# Patient Record
Sex: FEMALE | Race: OTHER | Employment: FULL TIME | URBAN - METROPOLITAN AREA
[De-identification: names, ages, dates, MRNs, and addresses within clinical notes are randomized per-mention and may not be internally consistent; named-entity substitution may affect disease eponyms.]

---

## 2019-02-04 ENCOUNTER — HOSPITAL ENCOUNTER (OUTPATIENT)
Age: 29
Discharge: HOME | End: 2019-02-04
Payer: COMMERCIAL

## 2019-02-04 ENCOUNTER — HOSPITAL ENCOUNTER (OUTPATIENT)
Dept: GENERAL RADIOLOGY | Age: 29
Discharge: HOME | End: 2019-02-04
Payer: COMMERCIAL

## 2019-02-04 DIAGNOSIS — Z11.1 SCREENING-PULMONARY TB: ICD-10-CM

## 2019-02-04 PROCEDURE — 71046 X-RAY EXAM CHEST 2 VIEWS: CPT

## 2019-10-31 ENCOUNTER — HOSPITAL ENCOUNTER (EMERGENCY)
Age: 29
Discharge: HOME | End: 2019-10-31
Attending: EMERGENCY MEDICINE
Payer: COMMERCIAL

## 2019-10-31 VITALS
DIASTOLIC BLOOD PRESSURE: 80 MMHG | WEIGHT: 212.52 LBS | RESPIRATION RATE: 18 BRPM | OXYGEN SATURATION: 100 % | TEMPERATURE: 99.3 F | HEART RATE: 100 BPM | SYSTOLIC BLOOD PRESSURE: 156 MMHG | BODY MASS INDEX: 34.16 KG/M2 | HEIGHT: 66 IN

## 2019-10-31 DIAGNOSIS — M79.605 LEFT LEG PAIN: Primary | ICD-10-CM

## 2019-10-31 PROCEDURE — 96372 THER/PROPH/DIAG INJ SC/IM: CPT

## 2019-10-31 PROCEDURE — 99283 EMERGENCY DEPT VISIT LOW MDM: CPT

## 2019-10-31 PROCEDURE — 6360000002 HC RX W HCPCS: Performed by: EMERGENCY MEDICINE

## 2019-10-31 RX ADMIN — ENOXAPARIN SODIUM 100 MG: 100 INJECTION SUBCUTANEOUS at 01:34

## 2019-10-31 SDOH — HEALTH STABILITY: MENTAL HEALTH: HOW OFTEN DO YOU HAVE A DRINK CONTAINING ALCOHOL?: NEVER

## 2019-10-31 ASSESSMENT — PAIN DESCRIPTION - DESCRIPTORS: DESCRIPTORS: ACHING

## 2019-10-31 ASSESSMENT — PAIN DESCRIPTION - PAIN TYPE: TYPE: ACUTE PAIN

## 2019-10-31 ASSESSMENT — PAIN SCALES - WONG BAKER: WONGBAKER_NUMERICALRESPONSE: 4

## 2019-10-31 ASSESSMENT — PAIN DESCRIPTION - LOCATION: LOCATION: LEG

## 2019-10-31 ASSESSMENT — PAIN SCALES - GENERAL
PAINLEVEL_OUTOF10: 6
PAINLEVEL_OUTOF10: 6

## 2021-01-22 ENCOUNTER — OFFICE VISIT (OUTPATIENT)
Dept: ORTHOPEDIC SURGERY | Age: 31
End: 2021-01-22

## 2021-01-22 VITALS — TEMPERATURE: 97.4 F | BODY MASS INDEX: 34.07 KG/M2 | HEIGHT: 66 IN | WEIGHT: 212 LBS

## 2021-01-22 DIAGNOSIS — M72.2 PLANTAR FASCIITIS OF LEFT FOOT: Primary | ICD-10-CM

## 2021-01-22 PROCEDURE — 99203 OFFICE O/P NEW LOW 30 MIN: CPT | Performed by: ORTHOPAEDIC SURGERY

## 2021-01-22 PROCEDURE — L3170 FOOT PLAS HEEL STABI PRE OTS: HCPCS | Performed by: ORTHOPAEDIC SURGERY

## 2021-01-22 RX ORDER — NAPROXEN 500 MG/1
500 TABLET ORAL 2 TIMES DAILY WITH MEALS
Qty: 60 TABLET | Refills: 0 | Status: SHIPPED | OUTPATIENT
Start: 2021-01-22 | End: 2022-09-16 | Stop reason: ALTCHOICE

## 2021-01-24 NOTE — PROGRESS NOTES
CHIEF COMPLAINT: Left heel pain/plantar fasciitis. HISTORY:  Ms. Amelia Zamorano 31 y.o.  female presents today for the first visit for evaluation of left heel pain which started 2020.  She is complaining of sharp  pain. Pain is increase with standing and wallking. Pain is sharp early in the morning with first few steps, dull achy pain by the end of the day. No radiation and no numbness and tingling sensation. No other complaint. 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     Past Medical History:   Diagnosis Date    [facility] ([facility])     Pulmonary embolism (HCC)        Past Surgical History:   Procedure Laterality Date     SECTION      LIPOSUCTION         Social History     Socioeconomic History    Marital status: Single     Spouse name: Not on file    Number of children: Not on file    Years of education: Not on file    Highest education level: Not on file   Occupational History    Not on file   Social Needs    Financial resource strain: Not on file    Food insecurity     Worry: Not on file     Inability: Not on file    Transportation needs     Medical: Not on file     Non-medical: Not on file   Tobacco Use    Smoking status: Never Smoker    Smokeless tobacco: Never Used   Substance and Sexual Activity    Alcohol use: Never     Frequency: Never    Drug use: Never    Sexual activity: Not on file   Lifestyle    Physical activity     Days per week: Not on file     Minutes per session: Not on file    Stress: Not on file   Relationships    Social connections     Talks on phone: Not on file     Gets together: Not on file     Attends [employer] service: Not on file     Active member of club or organization: Not on file     Attends meetings of clubs or organizations: Not on file     Relationship status: Not on file    Intimate partner violence     Fear of current or ex partner: Not on file     Emotionally abused: Not on file     Physically abused: Not on file     Forced sexual activity: Not on file   Other Topics Concern    Not on file   Social History Narrative    Not on file       History reviewed. No pertinent family history. No current outpatient medications on file prior to visit. No current facility-administered medications on file prior to visit. Pertinent items are noted in HPI  Review of systems reviewed from Patient History Form dated on 1/22/20/201 and available in the patient's chart under the Media tab. No change. PHYSICAL EXAMINATION:  Ms. Melly Tejada is a very pleasant 31 y.o.  female who presents today in no acute distress, awake, alert, and oriented. She is well dressed, nourished and  groomed. Patient with normal affect. Height is  5' 6\" (1.676 m), weight is 212 lb (96.2 kg), Body mass index is 34.22 kg/m². Resting respiratory rate is 16. Examination of the gait, showed that the patient walks heel-toe with a non-antalgic gait and no limp.  Examination of both ankles showing a good range of motion.  She has dorsiflexion to about 10 degrees bilaterally, which increased with knee flexion. She has intact sensation and good pedal pulses.  She has good strength in all four planes, including eversion, and has tenderness on deep palpation over the medial calcaneal tubercle, compared to the other side.  The ankles are stable to drawer test bilaterally, equally.      IMAGING:Xray's were reviewed.  3 views of the left foot taken in office today, and showed no acute fracture. No other abnormality. IMPRESSION: Left plantar fasciitis. PLAN: I discussed with the patient the treatment options. We recommended stretching exercises of the calf as well as stretching of the plantar fascia which was taught to the patient today. She will take NSAIDS Naprosyn. Use silicone heel pad. F/u in 6 weeks, PT if needed. She understands that this may take up to 6 months for the pain to resolve.           Procedures    Visco N Heel Shoe Inserts Patient was prescribed a Visco N Heel Shoe Insert. The left foot  will require protection / support from this orthosis to improve their function. The orthosis will assist in protecting the affected area, provide functional support and facilitate healing. The patient was educated and fit by a healthcare professional with expert knowledge and specialization in brace application while under the direct supervision of the treating physician. Verbal and written instructions for the use of and application of this item were provided. They were instructed to contact the office immediately should the brace result in increased pain, decreased sensation, increased swelling or worsening of the condition.        Myrtle Centeno MD

## 2021-02-13 DIAGNOSIS — M72.2 PLANTAR FASCIITIS OF LEFT FOOT: ICD-10-CM

## 2021-02-16 RX ORDER — NAPROXEN 500 MG/1
TABLET ORAL
Qty: 60 TABLET | Refills: 0 | OUTPATIENT
Start: 2021-02-16

## 2022-09-16 ENCOUNTER — OFFICE VISIT (OUTPATIENT)
Dept: PRIMARY CARE CLINIC | Age: 32
End: 2022-09-16
Payer: COMMERCIAL

## 2022-09-16 ENCOUNTER — OFFICE VISIT (OUTPATIENT)
Dept: ENT CLINIC | Age: 32
End: 2022-09-16
Payer: COMMERCIAL

## 2022-09-16 VITALS
HEART RATE: 80 BPM | TEMPERATURE: 97.5 F | HEIGHT: 66 IN | DIASTOLIC BLOOD PRESSURE: 71 MMHG | SYSTOLIC BLOOD PRESSURE: 115 MMHG | WEIGHT: 211.4 LBS | BODY MASS INDEX: 33.97 KG/M2

## 2022-09-16 VITALS
BODY MASS INDEX: 34.1 KG/M2 | HEART RATE: 78 BPM | HEIGHT: 66 IN | DIASTOLIC BLOOD PRESSURE: 74 MMHG | SYSTOLIC BLOOD PRESSURE: 131 MMHG | WEIGHT: 212.2 LBS | TEMPERATURE: 97.5 F

## 2022-09-16 DIAGNOSIS — R59.0 POSTERIOR CERVICAL ADENOPATHY: Primary | ICD-10-CM

## 2022-09-16 DIAGNOSIS — Z11.4 SCREENING FOR HIV (HUMAN IMMUNODEFICIENCY VIRUS): ICD-10-CM

## 2022-09-16 DIAGNOSIS — R59.0 CERVICAL LYMPHADENOPATHY: ICD-10-CM

## 2022-09-16 DIAGNOSIS — Z11.59 ENCOUNTER FOR HEPATITIS C SCREENING TEST FOR LOW RISK PATIENT: ICD-10-CM

## 2022-09-16 DIAGNOSIS — Z23 NEED FOR IMMUNIZATION AGAINST INFLUENZA: ICD-10-CM

## 2022-09-16 DIAGNOSIS — Z12.4 SCREENING FOR CERVICAL CANCER: ICD-10-CM

## 2022-09-16 DIAGNOSIS — Z00.00 ROUTINE GENERAL MEDICAL EXAMINATION AT A HEALTH CARE FACILITY: Primary | ICD-10-CM

## 2022-09-16 PROCEDURE — 99385 PREV VISIT NEW AGE 18-39: CPT | Performed by: STUDENT IN AN ORGANIZED HEALTH CARE EDUCATION/TRAINING PROGRAM

## 2022-09-16 PROCEDURE — 99203 OFFICE O/P NEW LOW 30 MIN: CPT | Performed by: OTOLARYNGOLOGY

## 2022-09-16 RX ORDER — ALBUTEROL SULFATE 0.63 MG/3ML
1 SOLUTION RESPIRATORY (INHALATION) EVERY 6 HOURS PRN
Status: ON HOLD | COMMUNITY
End: 2022-09-26

## 2022-09-16 SDOH — ECONOMIC STABILITY: FOOD INSECURITY: WITHIN THE PAST 12 MONTHS, THE FOOD YOU BOUGHT JUST DIDN'T LAST AND YOU DIDN'T HAVE MONEY TO GET MORE.: NEVER TRUE

## 2022-09-16 SDOH — ECONOMIC STABILITY: FOOD INSECURITY: WITHIN THE PAST 12 MONTHS, YOU WORRIED THAT YOUR FOOD WOULD RUN OUT BEFORE YOU GOT MONEY TO BUY MORE.: NEVER TRUE

## 2022-09-16 ASSESSMENT — PATIENT HEALTH QUESTIONNAIRE - PHQ9
9. THOUGHTS THAT YOU WOULD BE BETTER OFF DEAD, OR OF HURTING YOURSELF: 0
5. POOR APPETITE OR OVEREATING: 0
2. FEELING DOWN, DEPRESSED OR HOPELESS: 0
1. LITTLE INTEREST OR PLEASURE IN DOING THINGS: 0
SUM OF ALL RESPONSES TO PHQ QUESTIONS 1-9: 0
SUM OF ALL RESPONSES TO PHQ9 QUESTIONS 1 & 2: 0
10. IF YOU CHECKED OFF ANY PROBLEMS, HOW DIFFICULT HAVE THESE PROBLEMS MADE IT FOR YOU TO DO YOUR WORK, TAKE CARE OF THINGS AT HOME, OR GET ALONG WITH OTHER PEOPLE: 0
SUM OF ALL RESPONSES TO PHQ QUESTIONS 1-9: 0
6. FEELING BAD ABOUT YOURSELF - OR THAT YOU ARE A FAILURE OR HAVE LET YOURSELF OR YOUR FAMILY DOWN: 0
SUM OF ALL RESPONSES TO PHQ QUESTIONS 1-9: 0
4. FEELING TIRED OR HAVING LITTLE ENERGY: 0
SUM OF ALL RESPONSES TO PHQ QUESTIONS 1-9: 0
7. TROUBLE CONCENTRATING ON THINGS, SUCH AS READING THE NEWSPAPER OR WATCHING TELEVISION: 0
8. MOVING OR SPEAKING SO SLOWLY THAT OTHER PEOPLE COULD HAVE NOTICED. OR THE OPPOSITE, BEING SO FIGETY OR RESTLESS THAT YOU HAVE BEEN MOVING AROUND A LOT MORE THAN USUAL: 0
3. TROUBLE FALLING OR STAYING ASLEEP: 0

## 2022-09-16 ASSESSMENT — ENCOUNTER SYMPTOMS
STRIDOR: 0
ABDOMINAL PAIN: 0
SHORTNESS OF BREATH: 0
BLOOD IN STOOL: 0
CHEST TIGHTNESS: 0
BACK PAIN: 0
DIARRHEA: 0
CONSTIPATION: 0

## 2022-09-16 ASSESSMENT — SOCIAL DETERMINANTS OF HEALTH (SDOH): HOW HARD IS IT FOR YOU TO PAY FOR THE VERY BASICS LIKE FOOD, HOUSING, MEDICAL CARE, AND HEATING?: NOT HARD AT ALL

## 2022-09-16 NOTE — PROGRESS NOTES
2022    Lisbeth Parham (:  1990) is a 29 y.o. female, here for evaluation of the following medical concerns:    HPI    Well Adult Physical: Patient here for a comprehensive physical exam.The patient reports problems - enlarged lymph node  Do you take any herbs or supplements that were not prescribed by a doctor? no Are you taking calcium supplements? not applicable Are you taking aspirin daily? not applicable    Sexual activity: single partner, contraception - condoms   Diet: Unhealthy  Exercise: no regular exercise  Seatbelt use: yes    Review of Systems   Constitutional:  Positive for fatigue. Negative for activity change, appetite change and unexpected weight change. HENT:  Negative for hearing loss. Eyes:  Negative for visual disturbance. Respiratory:  Negative for chest tightness, shortness of breath and stridor. Cardiovascular:  Negative for chest pain and palpitations. Gastrointestinal:  Negative for abdominal pain, blood in stool, constipation and diarrhea. Endocrine: Negative for polydipsia, polyphagia and polyuria. Genitourinary:  Positive for pelvic pain. Negative for dysuria, genital sores, menstrual problem, vaginal bleeding, vaginal discharge and vaginal pain. Musculoskeletal:  Negative for arthralgias and back pain. Skin:  Negative for rash. Allergic/Immunologic: Negative for environmental allergies. Neurological:  Negative for dizziness, syncope and headaches. Hematological:  Positive for adenopathy. Psychiatric/Behavioral:  Negative for dysphoric mood. The patient is not nervous/anxious. Prior to Visit Medications    Medication Sig Taking?  Authorizing Provider   albuterol (ACCUNEB) 0.63 MG/3ML nebulizer solution Inhale 1 ampule into the lungs every 6 hours as needed Yes Historical Provider, MD   naproxen (NAPROSYN) 500 MG tablet Take 1 tablet by mouth 2 times daily (with meals)  Mayela Celestin MD        Allergies   Allergen Reactions Diphenhydramine        Past Medical History:   Diagnosis Date    DVT (deep venous thrombosis) (HCC)     Pulmonary embolism (HCC)        Past Surgical History:   Procedure Laterality Date     SECTION      LIPOSUCTION         Social History     Socioeconomic History    Marital status: Single     Spouse name: Not on file    Number of children: Not on file    Years of education: Not on file    Highest education level: Not on file   Occupational History    Not on file   Tobacco Use    Smoking status: Never    Smokeless tobacco: Never   Vaping Use    Vaping Use: Never used   Substance and Sexual Activity    Alcohol use: Never    Drug use: Never    Sexual activity: Yes     Partners: Female   Other Topics Concern    Not on file   Social History Narrative    Not on file     Social Determinants of Health     Financial Resource Strain: Low Risk     Difficulty of Paying Living Expenses: Not hard at all   Food Insecurity: No Food Insecurity    Worried About Running Out of Food in the Last Year: Never true    Ran Out of Food in the Last Year: Never true   Transportation Needs: Not on file   Physical Activity: Not on file   Stress: Not on file   Social Connections: Not on file   Intimate Partner Violence: Not on file   Housing Stability: Not on file        Family History   Problem Relation Age of Onset    Hypertension Mother     Hypertension Father        Vitals:    22 0905   BP: 131/74   Pulse: 78   Temp: 97.5 °F (36.4 °C)   TempSrc: Temporal   Weight: 212 lb 3.2 oz (96.3 kg)   Height: 5' 6\" (1.676 m)     Estimated body mass index is 34.25 kg/m² as calculated from the following:    Height as of this encounter: 5' 6\" (1.676 m). Weight as of this encounter: 212 lb 3.2 oz (96.3 kg). Physical Exam  Vitals reviewed. Constitutional:       Appearance: Normal appearance. She is normal weight. HENT:      Head: Normocephalic and atraumatic.       Right Ear: Tympanic membrane, ear canal and external ear normal. Left Ear: Tympanic membrane, ear canal and external ear normal.      Nose: Nose normal.      Mouth/Throat:      Mouth: Mucous membranes are moist.      Pharynx: Oropharynx is clear. Eyes:      Extraocular Movements: Extraocular movements intact. Conjunctiva/sclera: Conjunctivae normal.   Cardiovascular:      Rate and Rhythm: Normal rate and regular rhythm. Pulses: Normal pulses. Heart sounds: Normal heart sounds. Pulmonary:      Effort: Pulmonary effort is normal.      Breath sounds: Normal breath sounds. Abdominal:      General: Abdomen is flat. Bowel sounds are normal.      Palpations: Abdomen is soft. Musculoskeletal:         General: Normal range of motion. Cervical back: Normal range of motion and neck supple. Lymphadenopathy:      Cervical: Cervical adenopathy (Several small caliber enlarged cervical lymph nodes. There is 1 large golf ball sized lymph node in the middle of her right sternocleidomastoid.) present. Skin:     General: Skin is warm and dry. Capillary Refill: Capillary refill takes less than 2 seconds. Findings: No rash. Neurological:      General: No focal deficit present. Mental Status: She is alert and oriented to person, place, and time. Mental status is at baseline. Psychiatric:         Mood and Affect: Mood normal.         Behavior: Behavior normal.         Thought Content: Thought content normal.         Judgment: Judgment normal.       Separate Identifiable issues addressed today:  Lymphadenopathy  Ruben Lott is a very pleasant 71-year-old female who presents today for evaluation of cervical lymphadenopathy. Patient states that about 6 months ago she noticed a small lymph node was the largest in her left supraclavicular region. Since that time she has noted more and more cervical lymph nodes appearing, which are easily palpable.   There is 1 particular lymph node in the right side of her neck, which started out about the size of a marble, but has since grown to about the size of a golf ball. She is concerned because she does have family history of ovarian cancer. She is of course had cervical lymphadenopathy, but also complains of worsening fatigue. She has had no change in appetite, weight loss, headaches, nausea, vomiting or diarrhea. She has had some left-sided pelvic pain. ASSESSMENT/PLAN:  Vijay Almaguer was seen today for establish care and other. Diagnoses and all orders for this visit:    Routine general medical examination at a health care facility: Vitals reviewed with limits. BMI obese. Depression screen negative. Reviewed diet exercise regimen patient record appropriate lifestyle modifications. -     Comprehensive Metabolic Panel; Future    Cervical lymphadenopathy: My concern given multiple enlarged cervical lymph node and one that is large and firm in nature is cancer. This is particularly concerning given the family history of ovarian cancer. Called and spoke with Dr. Zaira Hoffman today and he is willing to see her in biopsy lymph node today. Follow-up pending results. -     Path Review, Smear; Future  -     CBC with Auto Differential; Future  -     Jud Barr MD, Otolaryngology, [facility]    Encounter for hepatitis C screening test for low risk patient  -     Hepatitis C Antibody; Future    Screening for HIV (human immunodeficiency virus)  -     HIV Screen; Future    Return in about 6 weeks (around 10/28/2022) for Lymphadenopathy. An  electronic signature was used to authenticate this note.     --Eileen Alas DO on 9/16/2022 at 9:48 AM

## 2022-09-16 NOTE — PATIENT INSTRUCTIONS
Patient Education        Well Visit, Ages 25 to 72: Care Instructions  Well visits can help you stay healthy. Your doctor has checked your overall health and may have suggested ways to take good care of yourself. Your doctor also may have recommended tests. You can help prevent illness with healthy eating, good sleep, vaccinations, regular exercise, and other steps. Get the tests that you and your doctor decide on. Depending on your age and risks, examples might include screening for diabetes; hepatitis C; HIV; and cervical, breast, lung, and colon cancer. Screening helps find diseases before any symptoms appear. Eat healthy foods. Choose fruits, vegetables, whole grains, lean protein, and low-fat dairy foods. Limit saturated fat and reduce salt. Limit alcohol. Men should have no more than 2 drinks a day. Women should have no more than 1. For some people, no alcohol is the best choice. Exercise. Get at least 30 minutes of exercise on most days of the week. Walking can be a good choice. Reach and stay at your healthy weight. This will lower your risk for many health problems. Take care of your mental health. Try to stay connected with friends, family, and community, and find ways to manage stress. If you're feeling depressed or hopeless, talk to someone. A counselor can help. If you don't have a counselor, talk to your doctor. Talk to your doctor if you think you may have a problem with alcohol or drug use. This includes prescription medicines and illegal drugs. Avoid tobacco and nicotine: Don't smoke, vape, or chew. If you need help quitting, talk to your doctor. Practice safer sex. Getting tested, using condoms or dental dams, and limiting sex partners can help prevent STIs. Use birth control if it's important to you to prevent pregnancy. Talk with your doctor about your choices and what might be best for you. Prevent problems where you can.  Protect your skin from too much sun, wash your hands, brush your teeth twice a day, and wear a seat belt in the car. Where can you learn more? Go to [email or web address]://[email or web address].[email or web address] org and sign in to your [employer] account. Enter P072 in the [facility] box to learn more about \"Well Visit, Ages 25 to 72: Care Instructions. \"     If you do not have an account, please click on the \"Sign Up Now\" link. Current as of: March 9, 2022               Content Version: 13.4  © 0627-9696 [employer]wise, Incorporated. Care instructions adapted under license by [facility] [place] ([facility] [place]). If you have questions about a medical condition or this instruction, always ask your healthcare professional. [address] any warranty or liability for your use of this information.

## 2022-09-16 NOTE — PROGRESS NOTES
CHIEF COMPLAINT: Cervical adenopathy. HISTORY OF PRESENT ILLNESS:  29 y.o. female referred by Dr. Mallika Hickey who presents with a 5-month history of swollen glands in the neck. The patient feels a mass on the left side near the supraclavicular fossa and 2 masses on the right side in the posterior cervical triangle. They are progressively getting larger. They are painless. Patient has no hoarseness, no throat pain. No dysphagia. She has no fever, chills, weight loss, night sweats. She has never smoked. PAST MEDICAL HISTORY:   Social History     Tobacco Use   Smoking Status Never   Smokeless Tobacco Never                                                    Social History     Substance and Sexual Activity   Alcohol Use Never                                                    Current Outpatient Medications:     albuterol (ACCUNEB) 0.63 MG/3ML nebulizer solution, Inhale 1 ampule into the lungs every 6 hours as needed, Disp: , Rfl:     naproxen (NAPROSYN) 500 MG tablet, Take 1 tablet by mouth 2 times daily (with meals), Disp: 60 tablet, Rfl: 0                                                 Past Medical History:   Diagnosis Date    DVT (deep venous thrombosis) ([facility] [address]     Pulmonary embolism ([facility] [address]                                                     Past Surgical History:   Procedure Laterality Date     SECTION      LIPOSUCTION       FAMILY HISTORY: Family history reviewed. Except as noted in history of present illness, there is no pertinent family history      REVIEW OF SYSTEMS:  All pertinent positive and negative review of systems included in HPI. Otherwise, all systems are reviewed and negative.     PHYSICAL EXAMINATION:   GENERAL: wdwn- no acute distress  RESPIRATORY:  No stridor or respiratory distress  COMMUNICATION :  Normal voice  MENTAL STATUS:  Mood and affect normal, oriented X 3  HEAD AND FACE:  No abnormalities of the skin of face or head  EXTERNAL EARS AND NOSE:  Normal pinnae bilateral  FACIAL

## 2022-09-26 ENCOUNTER — APPOINTMENT (OUTPATIENT)
Dept: GENERAL RADIOLOGY | Age: 32
DRG: 641 | End: 2022-09-26
Payer: COMMERCIAL

## 2022-09-26 ENCOUNTER — HOSPITAL ENCOUNTER (OUTPATIENT)
Dept: CT IMAGING | Age: 32
Discharge: HOME | End: 2022-09-26
Payer: COMMERCIAL

## 2022-09-26 ENCOUNTER — APPOINTMENT (OUTPATIENT)
Dept: CT IMAGING | Age: 32
DRG: 641 | End: 2022-09-26
Payer: COMMERCIAL

## 2022-09-26 ENCOUNTER — APPOINTMENT (OUTPATIENT)
Dept: NUCLEAR MEDICINE | Age: 32
DRG: 641 | End: 2022-09-26
Payer: COMMERCIAL

## 2022-09-26 ENCOUNTER — HOSPITAL ENCOUNTER (INPATIENT)
Age: 32
LOS: 2 days | Discharge: HOME | DRG: 641 | End: 2022-09-28
Attending: EMERGENCY MEDICINE | Admitting: INTERNAL MEDICINE
Payer: COMMERCIAL

## 2022-09-26 DIAGNOSIS — R00.0 TACHYCARDIA: ICD-10-CM

## 2022-09-26 DIAGNOSIS — R55 SYNCOPE AND COLLAPSE: Primary | ICD-10-CM

## 2022-09-26 DIAGNOSIS — R59.0 POSTERIOR CERVICAL ADENOPATHY: ICD-10-CM

## 2022-09-26 DIAGNOSIS — G43.009 ATYPICAL MIGRAINE: ICD-10-CM

## 2022-09-26 PROBLEM — R19.7 DIARRHEA: Status: ACTIVE | Noted: 2022-09-26

## 2022-09-26 PROBLEM — E66.9 OBESITY (BMI 30-39.9): Status: ACTIVE | Noted: 2022-09-26

## 2022-09-26 LAB
A/G RATIO: 1.1 (ref 1.1–2.2)
ALBUMIN SERPL-MCNC: 4 G/DL (ref 3.4–5)
ALP BLD-CCNC: 108 U/L (ref 40–129)
ALT SERPL-CCNC: 17 U/L (ref 10–40)
ANION GAP SERPL CALCULATED.3IONS-SCNC: 11 MMOL/L (ref 3–16)
APPEARANCE CSF: ABNORMAL
APPEARANCE CSF: CLEAR
AST SERPL-CCNC: 19 U/L (ref 15–37)
BASO CSF: 1 %
BASOPHILS ABSOLUTE: 0 K/UL (ref 0–0.2)
BASOPHILS RELATIVE PERCENT: 0 %
BILIRUB SERPL-MCNC: 0.5 MG/DL (ref 0–1)
BILIRUBIN URINE: NEGATIVE
BLOOD, URINE: NEGATIVE
BUN BLDV-MCNC: 14 MG/DL (ref 7–20)
CALCIUM SERPL-MCNC: 9.3 MG/DL (ref 8.3–10.6)
CHLORIDE BLD-SCNC: 107 MMOL/L (ref 99–110)
CLARITY: CLEAR
CLOT EVALUATION CSF: ABNORMAL
CLOT EVALUATION CSF: ABNORMAL
CO2: 23 MMOL/L (ref 21–32)
COLOR CSF: ABNORMAL
COLOR CSF: COLORLESS
COLOR: YELLOW
CREAT SERPL-MCNC: 0.7 MG/DL (ref 0.6–1.1)
EKG ATRIAL RATE: 107 BPM
EKG DIAGNOSIS: NORMAL
EKG P AXIS: 53 DEGREES
EKG P-R INTERVAL: 164 MS
EKG Q-T INTERVAL: 310 MS
EKG QRS DURATION: 86 MS
EKG QTC CALCULATION (BAZETT): 413 MS
EKG R AXIS: 78 DEGREES
EKG T AXIS: 44 DEGREES
EKG VENTRICULAR RATE: 107 BPM
EOSINOPHILS ABSOLUTE: 0 K/UL (ref 0–0.6)
EOSINOPHILS RELATIVE PERCENT: 0.1 %
GFR AFRICAN AMERICAN: >60
GFR NON-AFRICAN AMERICAN: >60
GLUCOSE BLD-MCNC: 104 MG/DL (ref 70–99)
GLUCOSE URINE: NEGATIVE MG/DL
GLUCOSE, CSF: 59 MG/DL (ref 40–80)
HCG QUALITATIVE: NEGATIVE
HCT VFR BLD CALC: 39.5 % (ref 36–48)
HEMOGLOBIN: 13 G/DL (ref 12–16)
INR BLD: 1.02 (ref 0.87–1.14)
KETONES, URINE: NEGATIVE MG/DL
LEUKOCYTE ESTERASE, URINE: NEGATIVE
LIPASE: 15 U/L (ref 13–60)
LYMPHOCYTES ABSOLUTE: 0.4 K/UL (ref 1–5.1)
LYMPHOCYTES RELATIVE PERCENT: 7 %
LYMPHS CSF: 21 % (ref 40–80)
MCH RBC QN AUTO: 28.8 PG (ref 26–34)
MCHC RBC AUTO-ENTMCNC: 32.9 G/DL (ref 31–36)
MCV RBC AUTO: 87.6 FL (ref 80–100)
MICROSCOPIC EXAMINATION: NORMAL
MONOCYTE, CSF: 2 % (ref 15–45)
MONOCYTES ABSOLUTE: 0 K/UL (ref 0–1.3)
MONOCYTES RELATIVE PERCENT: 0.5 %
NEUTROPHILS ABSOLUTE: 4.8 K/UL (ref 1.7–7.7)
NEUTROPHILS RELATIVE PERCENT: 92.4 %
NEUTROPHILS, CSF: 76 % (ref 0–6)
NITRITE, URINE: NEGATIVE
NO DIFFERENTIAL CSF: ABNORMAL
NUMBER OF CELLS CSF: 100
PDW BLD-RTO: 13.2 % (ref 12.4–15.4)
PH UA: 5 (ref 5–8)
PLATELET # BLD: 217 K/UL (ref 135–450)
PMV BLD AUTO: 9.3 FL (ref 5–10.5)
POTASSIUM SERPL-SCNC: 3.9 MMOL/L (ref 3.5–5.1)
PROTEIN CSF: 46 MG/DL (ref 15–45)
PROTEIN UA: NEGATIVE MG/DL
PROTHROMBIN TIME: 13.4 SEC (ref 11.7–14.5)
RBC # BLD: 4.51 M/UL (ref 4–5.2)
RBC CSF: 8400 /CUMM
RBC CSF: <1000 /CUMM
SODIUM BLD-SCNC: 141 MMOL/L (ref 136–145)
SPECIFIC GRAVITY UA: >=1.03 (ref 1–1.03)
TOTAL PROTEIN: 7.6 G/DL (ref 6.4–8.2)
TROPONIN: <0.01 NG/ML
TROPONIN: <0.01 NG/ML
TUBE NUMBER CSF: ABNORMAL
TUBE NUMBER CSF: ABNORMAL
URINE REFLEX TO CULTURE: NORMAL
URINE TYPE: NORMAL
UROBILINOGEN, URINE: 0.2 E.U./DL
WBC # BLD: 5.2 K/UL (ref 4–11)
WBC CSF: 21 /CUMM (ref 0–5)
WBC CSF: 5 /CUMM (ref 0–5)

## 2022-09-26 PROCEDURE — 83690 ASSAY OF LIPASE: CPT

## 2022-09-26 PROCEDURE — 2580000003 HC RX 258: Performed by: PHYSICIAN ASSISTANT

## 2022-09-26 PROCEDURE — 86665 EPSTEIN-BARR CAPSID VCA: CPT

## 2022-09-26 PROCEDURE — 89050 BODY FLUID CELL COUNT: CPT

## 2022-09-26 PROCEDURE — 99285 EMERGENCY DEPT VISIT HI MDM: CPT

## 2022-09-26 PROCEDURE — 6360000002 HC RX W HCPCS: Performed by: PHYSICIAN ASSISTANT

## 2022-09-26 PROCEDURE — A9540 TC99M MAA: HCPCS | Performed by: EMERGENCY MEDICINE

## 2022-09-26 PROCEDURE — 6360000002 HC RX W HCPCS: Performed by: INTERNAL MEDICINE

## 2022-09-26 PROCEDURE — 96374 THER/PROPH/DIAG INJ IV PUSH: CPT

## 2022-09-26 PROCEDURE — 84157 ASSAY OF PROTEIN OTHER: CPT

## 2022-09-26 PROCEDURE — 3430000000 HC RX DIAGNOSTIC RADIOPHARMACEUTICAL: Performed by: EMERGENCY MEDICINE

## 2022-09-26 PROCEDURE — 6360000004 HC RX CONTRAST MEDICATION: Performed by: OTOLARYNGOLOGY

## 2022-09-26 PROCEDURE — 85610 PROTHROMBIN TIME: CPT

## 2022-09-26 PROCEDURE — 93010 ELECTROCARDIOGRAM REPORT: CPT | Performed by: INTERNAL MEDICINE

## 2022-09-26 PROCEDURE — 1200000000 HC SEMI PRIVATE

## 2022-09-26 PROCEDURE — 71045 X-RAY EXAM CHEST 1 VIEW: CPT

## 2022-09-26 PROCEDURE — 83036 HEMOGLOBIN GLYCOSYLATED A1C: CPT

## 2022-09-26 PROCEDURE — 6370000000 HC RX 637 (ALT 250 FOR IP): Performed by: INTERNAL MEDICINE

## 2022-09-26 PROCEDURE — 2580000003 HC RX 258: Performed by: INTERNAL MEDICINE

## 2022-09-26 PROCEDURE — 86663 EPSTEIN-BARR ANTIBODY: CPT

## 2022-09-26 PROCEDURE — 81003 URINALYSIS AUTO W/O SCOPE: CPT

## 2022-09-26 PROCEDURE — 2060000000 HC ICU INTERMEDIATE R&B

## 2022-09-26 PROCEDURE — 84443 ASSAY THYROID STIM HORMONE: CPT

## 2022-09-26 PROCEDURE — 82607 VITAMIN B-12: CPT

## 2022-09-26 PROCEDURE — 84703 CHORIONIC GONADOTROPIN ASSAY: CPT

## 2022-09-26 PROCEDURE — 78582 LUNG VENTILAT&PERFUS IMAGING: CPT

## 2022-09-26 PROCEDURE — 62270 DX LMBR SPI PNXR: CPT

## 2022-09-26 PROCEDURE — 36415 COLL VENOUS BLD VENIPUNCTURE: CPT

## 2022-09-26 PROCEDURE — 2580000003 HC RX 258: Performed by: EMERGENCY MEDICINE

## 2022-09-26 PROCEDURE — 82746 ASSAY OF FOLIC ACID SERUM: CPT

## 2022-09-26 PROCEDURE — 85025 COMPLETE CBC W/AUTO DIFF WBC: CPT

## 2022-09-26 PROCEDURE — 70450 CT HEAD/BRAIN W/O DYE: CPT

## 2022-09-26 PROCEDURE — 84484 ASSAY OF TROPONIN QUANT: CPT

## 2022-09-26 PROCEDURE — 89051 BODY FLUID CELL COUNT: CPT

## 2022-09-26 PROCEDURE — 80053 COMPREHEN METABOLIC PANEL: CPT

## 2022-09-26 PROCEDURE — 93005 ELECTROCARDIOGRAM TRACING: CPT | Performed by: PHYSICIAN ASSISTANT

## 2022-09-26 PROCEDURE — 6370000000 HC RX 637 (ALT 250 FOR IP): Performed by: PHYSICIAN ASSISTANT

## 2022-09-26 PROCEDURE — 86664 EPSTEIN-BARR NUCLEAR ANTIGEN: CPT

## 2022-09-26 PROCEDURE — 96361 HYDRATE IV INFUSION ADD-ON: CPT

## 2022-09-26 PROCEDURE — 70491 CT SOFT TISSUE NECK W/DYE: CPT

## 2022-09-26 PROCEDURE — A9558 XE133 XENON 10MCI: HCPCS | Performed by: EMERGENCY MEDICINE

## 2022-09-26 PROCEDURE — 82945 GLUCOSE OTHER FLUID: CPT

## 2022-09-26 RX ORDER — SODIUM CHLORIDE 0.9 % (FLUSH) 0.9 %
5-40 SYRINGE (ML) INJECTION EVERY 12 HOURS SCHEDULED
Status: DISCONTINUED | OUTPATIENT
Start: 2022-09-26 | End: 2022-09-28 | Stop reason: HOSPADM

## 2022-09-26 RX ORDER — 0.9 % SODIUM CHLORIDE 0.9 %
1000 INTRAVENOUS SOLUTION INTRAVENOUS ONCE
Status: COMPLETED | OUTPATIENT
Start: 2022-09-26 | End: 2022-09-26

## 2022-09-26 RX ORDER — SODIUM CHLORIDE 9 MG/ML
INJECTION, SOLUTION INTRAVENOUS CONTINUOUS
Status: DISCONTINUED | OUTPATIENT
Start: 2022-09-26 | End: 2022-09-28 | Stop reason: HOSPADM

## 2022-09-26 RX ORDER — SODIUM CHLORIDE 9 MG/ML
INJECTION, SOLUTION INTRAVENOUS PRN
Status: DISCONTINUED | OUTPATIENT
Start: 2022-09-26 | End: 2022-09-28 | Stop reason: HOSPADM

## 2022-09-26 RX ORDER — ONDANSETRON 2 MG/ML
4 INJECTION INTRAMUSCULAR; INTRAVENOUS ONCE
Status: COMPLETED | OUTPATIENT
Start: 2022-09-26 | End: 2022-09-26

## 2022-09-26 RX ORDER — ONDANSETRON 2 MG/ML
4 INJECTION INTRAMUSCULAR; INTRAVENOUS EVERY 6 HOURS PRN
Status: DISCONTINUED | OUTPATIENT
Start: 2022-09-26 | End: 2022-09-28 | Stop reason: HOSPADM

## 2022-09-26 RX ORDER — XENON XE-133 10 MCI/1
14 GAS RESPIRATORY (INHALATION)
Status: COMPLETED | OUTPATIENT
Start: 2022-09-26 | End: 2022-09-26

## 2022-09-26 RX ORDER — ENOXAPARIN SODIUM 100 MG/ML
40 INJECTION SUBCUTANEOUS DAILY
Status: DISCONTINUED | OUTPATIENT
Start: 2022-09-26 | End: 2022-09-28 | Stop reason: HOSPADM

## 2022-09-26 RX ORDER — ACETAMINOPHEN 500 MG
1000 TABLET ORAL ONCE
Status: COMPLETED | OUTPATIENT
Start: 2022-09-26 | End: 2022-09-26

## 2022-09-26 RX ORDER — SODIUM CHLORIDE 0.9 % (FLUSH) 0.9 %
5-40 SYRINGE (ML) INJECTION PRN
Status: DISCONTINUED | OUTPATIENT
Start: 2022-09-26 | End: 2022-09-28 | Stop reason: HOSPADM

## 2022-09-26 RX ORDER — ALBUTEROL SULFATE 2.5 MG/3ML
0.63 SOLUTION RESPIRATORY (INHALATION) EVERY 6 HOURS PRN
Status: DISCONTINUED | OUTPATIENT
Start: 2022-09-26 | End: 2022-09-28 | Stop reason: HOSPADM

## 2022-09-26 RX ORDER — ACETAMINOPHEN 325 MG/1
650 TABLET ORAL EVERY 6 HOURS PRN
Status: DISCONTINUED | OUTPATIENT
Start: 2022-09-26 | End: 2022-09-28 | Stop reason: HOSPADM

## 2022-09-26 RX ORDER — ACETAMINOPHEN 650 MG/1
650 SUPPOSITORY RECTAL EVERY 6 HOURS PRN
Status: DISCONTINUED | OUTPATIENT
Start: 2022-09-26 | End: 2022-09-28 | Stop reason: HOSPADM

## 2022-09-26 RX ORDER — POLYETHYLENE GLYCOL 3350 17 G/17G
17 POWDER, FOR SOLUTION ORAL DAILY PRN
Status: DISCONTINUED | OUTPATIENT
Start: 2022-09-26 | End: 2022-09-28 | Stop reason: HOSPADM

## 2022-09-26 RX ORDER — SODIUM CHLORIDE 0.9 % (FLUSH) 0.9 %
10 SYRINGE (ML) INJECTION PRN
Status: DISCONTINUED | OUTPATIENT
Start: 2022-09-26 | End: 2022-09-26

## 2022-09-26 RX ORDER — ONDANSETRON 4 MG/1
4 TABLET, ORALLY DISINTEGRATING ORAL EVERY 8 HOURS PRN
Status: DISCONTINUED | OUTPATIENT
Start: 2022-09-26 | End: 2022-09-28 | Stop reason: HOSPADM

## 2022-09-26 RX ADMIN — ENOXAPARIN SODIUM 40 MG: 100 INJECTION SUBCUTANEOUS at 20:36

## 2022-09-26 RX ADMIN — ACETAMINOPHEN 650 MG: 325 TABLET ORAL at 23:53

## 2022-09-26 RX ADMIN — SODIUM CHLORIDE 1000 ML: 9 INJECTION, SOLUTION INTRAVENOUS at 11:59

## 2022-09-26 RX ADMIN — IOPAMIDOL 75 ML: 755 INJECTION, SOLUTION INTRAVENOUS at 07:42

## 2022-09-26 RX ADMIN — SODIUM CHLORIDE 1000 ML: 9 INJECTION, SOLUTION INTRAVENOUS at 14:59

## 2022-09-26 RX ADMIN — Medication 4.58 MILLICURIE: at 14:32

## 2022-09-26 RX ADMIN — XENON XE-133 14 MILLICURIE: 10 GAS RESPIRATORY (INHALATION) at 14:32

## 2022-09-26 RX ADMIN — ONDANSETRON 4 MG: 2 INJECTION INTRAMUSCULAR; INTRAVENOUS at 11:54

## 2022-09-26 RX ADMIN — ACETAMINOPHEN 1000 MG: 500 TABLET ORAL at 12:32

## 2022-09-26 RX ADMIN — Medication 10 ML: at 14:32

## 2022-09-26 RX ADMIN — SODIUM CHLORIDE, PRESERVATIVE FREE 10 ML: 5 INJECTION INTRAVENOUS at 20:33

## 2022-09-26 RX ADMIN — SODIUM CHLORIDE: 9 INJECTION, SOLUTION INTRAVENOUS at 20:35

## 2022-09-26 ASSESSMENT — ENCOUNTER SYMPTOMS
SHORTNESS OF BREATH: 0
COUGH: 0
CHEST TIGHTNESS: 0
NAUSEA: 1
VOMITING: 1
ABDOMINAL PAIN: 0
DIARRHEA: 0

## 2022-09-26 ASSESSMENT — PAIN DESCRIPTION - DESCRIPTORS
DESCRIPTORS: THROBBING
DESCRIPTORS: THROBBING

## 2022-09-26 ASSESSMENT — PAIN DESCRIPTION - PAIN TYPE
TYPE: ACUTE PAIN
TYPE: ACUTE PAIN

## 2022-09-26 ASSESSMENT — PAIN DESCRIPTION - ORIENTATION
ORIENTATION: OTHER (COMMENT)
ORIENTATION: ANTERIOR

## 2022-09-26 ASSESSMENT — PAIN DESCRIPTION - LOCATION
LOCATION: HEAD
LOCATION: FLANK
LOCATION: HEAD

## 2022-09-26 ASSESSMENT — PAIN SCALES - GENERAL
PAINLEVEL_OUTOF10: 0
PAINLEVEL_OUTOF10: 6
PAINLEVEL_OUTOF10: 9
PAINLEVEL_OUTOF10: 6
PAINLEVEL_OUTOF10: 8

## 2022-09-26 ASSESSMENT — PAIN DESCRIPTION - FREQUENCY
FREQUENCY: INTERMITTENT
FREQUENCY: INTERMITTENT

## 2022-09-26 ASSESSMENT — PAIN DESCRIPTION - ONSET
ONSET: ON-GOING
ONSET: ON-GOING

## 2022-09-26 ASSESSMENT — PAIN - FUNCTIONAL ASSESSMENT: PAIN_FUNCTIONAL_ASSESSMENT: 0-10

## 2022-09-26 NOTE — RT PROTOCOL NOTE
RT Inhaler-Nebulizer Bronchodilator Protocol Note    There is a bronchodilator order in the chart from a provider indicating to follow the RT Bronchodilator Protocol and there is an Initiate RT Inhaler-Nebulizer Bronchodilator Protocol order as well (see protocol at bottom of note). CXR Findings:  XR CHEST PORTABLE    Result Date: 9/26/2022  No radiographic evidence of acute pulmonary disease. The findings from the last RT Protocol Assessment were as follows:   History Pulmonary Disease: None or smoker <15 pack years  Respiratory Pattern: Regular pattern and RR 12-20 bpm  Breath Sounds: Clear breath sounds  Cough: Strong, spontaneous, non-productive  Indication for Bronchodilator Therapy:    Bronchodilator Assessment Score: 0    Aerosolized bronchodilator medication orders have been revised according to the RT Inhaler-Nebulizer Bronchodilator Protocol below. Respiratory Therapist to perform RT Therapy Protocol Assessment initially then follow the protocol. Repeat RT Therapy Protocol Assessment PRN for score 0-3 or on second treatment, BID, and PRN for scores above 3. No Indications - adjust the frequency to every 6 hours PRN wheezing or bronchospasm, if no treatments needed after 48 hours then discontinue using Per Protocol order mode. If indication present, adjust the RT bronchodilator orders based on the Bronchodilator Assessment Score as indicated below. Use Inhaler orders unless patient has one or more of the following: on home nebulizer, not able to hold breath for 10 seconds, is not alert and oriented, cannot activate and use MDI correctly, or respiratory rate 25 breaths per minute or more, then use the equivalent nebulizer order(s) with same Frequency and PRN reasons based on the score. If a patient is on this medication at home then do not decrease Frequency below that used at home.     0-3 - enter or revise RT bronchodilator order(s) to equivalent RT Bronchodilator order with Frequency of every 4 hours PRN for wheezing or increased work of breathing using Per Protocol order mode. 4-6 - enter or revise RT Bronchodilator order(s) to two equivalent RT bronchodilator orders with one order with BID Frequency and one order with Frequency of every 4 hours PRN wheezing or increased work of breathing using Per Protocol order mode. 7-10 - enter or revise RT Bronchodilator order(s) to two equivalent RT bronchodilator orders with one order with TID Frequency and one order with Frequency of every 4 hours PRN wheezing or increased work of breathing using Per Protocol order mode. 11-13 - enter or revise RT Bronchodilator order(s) to one equivalent RT bronchodilator order with QID Frequency and an Albuterol order with Frequency of every 4 hours PRN wheezing or increased work of breathing using Per Protocol order mode. Greater than 13 - enter or revise RT Bronchodilator order(s) to one equivalent RT bronchodilator order with every 4 hours Frequency and an Albuterol order with Frequency of every 2 hours PRN wheezing or increased work of breathing using Per Protocol order mode. RT to enter RT Home Evaluation for COPD & MDI Assessment order using Per Protocol order mode.     Electronically signed by Yeni Waller RCP on 9/26/2022 at 7:59 PM

## 2022-09-26 NOTE — ED NOTES
Lumbar puncture performed by Dr. Tuan Jay and cultures walked down to lab by this RN. Pt tolerated well. Will continue to monitor.       Ana Wiggins RN  09/26/22 [id number]

## 2022-09-26 NOTE — PROGRESS NOTES
09/26/22 1959   RT Protocol   History Pulmonary Disease 0   Respiratory pattern 0   Breath sounds 0   Cough 0   Bronchodilator Assessment Score 0

## 2022-09-26 NOTE — H&P
HOSPITALISTS HISTORY AND PHYSICAL    9/26/2022 6:04 PM    Patient Information:  Tala Jack is a 29 y.o. female [id number]  PCP:  Yesy Armando DO (Tel: [phone] )    Chief complaint:    Chief Complaint   Patient presents with    Nausea     Had a CT with contrast this morning at 730. Noticed a migraine on the way home, laid down in her car for approx 40 minutes, became dizzy, lightheaded, blacked out and woke up vomiting and urinated on herself. States never had a reaction to contrast dye previously. Now starting to have severe flank pain bilat. History of Present Illness:  Laura Jeffers is a 29 y.o. 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 She has been noticing lymphadenopathy in L supraclavilar area, neck and occipital areas for past 4-5 months. Saw Dr Krishan Vides (ENT) who ordered for outpatient CT Neck with contrast.  Patient had CT Ne[place] in [place] today and 40 minutes later could not drive as she was dizzy and lightheaded. No fecal incontinence. No CP, SOB, palpitations or aura. Says she had diarrhea for about 7 days. Talia Badillo is ICU RN at [facility]. Patient denies melena and hematochezia. Had LP in ED. VQ low probability for PE in ED. Has some dyspareunia. No vaginal discharge. Some L sided abdominal pain at times. No unintentional weight loss or weight gain. No dysphagia. No nausea or vomiting. Otherwise complete ROS is negative unless listed above. REVIEW OF SYSTEMS:   Pertinent positives as noted in HPI. All other systems were reviewed and are negative. Past Medical History:   has a past medical history of Asthma, DVT (deep venous thrombosis) ([address] Headache, and Pulmonary embolism ([address]      Past Surgical History:   has a past surgical history that includes  section and Liposuction. Medications:  No current facility-administered medications on file prior to encounter. Current Outpatient Medications on File Prior to Encounter   Medication Sig Dispense Refill    albuterol (ACCUNEB) 0.63 MG/3ML nebulizer solution Inhale 1 ampule into the lungs every 6 hours as needed      Norelgestromin-Eth Estradiol [place] TD) Place onto the skin         Allergies: Allergies   Allergen Reactions    Diphenhydramine         Social History:  Patient Lives with thiago   reports that she has never smoked. She has never used smokeless tobacco. She reports that she does not drink alcohol and does not use drugs. Family History:  family history includes Breast Cancer in her maternal aunt; Hypertension in her father and mother; Ovarian Cancer in her maternal aunt and maternal aunt. Physical Exam:  /76   Pulse 85   Temp 99.3 °F (37.4 °C) (Oral)   Resp 14   Ht 5' 6\" (1.676 m)   Wt 203 lb (92.1 kg)   LMP 2022   SpO2 100%   BMI 32.77 kg/m²     General appearance:  Appears comfortable. Well nourished  Eyes: Sclera clear, pupils equal  ENT: Moist mucus membranes, no thrush. Trachea midline. Cardiovascular: Regular rhythm, normal S1, S2. No murmur, gallop, rub. No edema in lower extremities  Respiratory: Clear to auscultation bilaterally, no wheeze, good inspiratory effort  Gastrointestinal: Abdomen soft, mild LLQ tenderness, not distended, normal bowel sounds  Musculoskeletal: L supraclavicular and BL cervical LAD noted  Neurology: Cranial nerves grossly intact. Alert and oriented in time, place and person. No speech or motor deficits  Psychiatry: Appropriate affect.  Not agitated  Skin: Warm, dry, normal turgor, no rash  Brisk capillary refill, peripheral pulses palpable   Labs:  CBC:   Lab Results   Component Value Date/Time    WBC 5.2 2022 11:27 AM    RBC 4.51 2022 11:27 AM    HGB 13.0 2022 11:27 AM HCT 39.5 09/26/2022 11:27 AM    MCV 87.6 09/26/2022 11:27 AM    MCH 28.8 09/26/2022 11:27 AM    MCHC 32.9 09/26/2022 11:27 AM    RDW 13.2 09/26/2022 11:27 AM     09/26/2022 11:27 AM    MPV 9.3 09/26/2022 11:27 AM     BMP:    Lab Results   Component Value Date/Time     09/26/2022 11:27 AM    K 3.9 09/26/2022 11:27 AM     09/26/2022 11:27 AM    CO2 23 09/26/2022 11:27 AM    BUN 14 09/26/2022 11:27 AM    CREATININE 0.7 09/26/2022 11:27 AM    CALCIUM 9.3 09/26/2022 11:27 AM    GFRAA >60 09/26/2022 11:27 AM    LABGLOM >60 09/26/2022 11:27 AM    GLUCOSE 104 09/26/2022 11:27 AM     NM LUNG VENT/PERFUSION (VQ)   Final Result   Low probability for pulmonary embolus. CT HEAD WO CONTRAST   Final Result   No acute intracranial abnormality. XR CHEST PORTABLE   Final Result   No radiographic evidence of acute pulmonary disease. Problem List  Principal Problem:    Syncope and collapse  Active Problems:    Urine incontinence    Dehydration    Headache    Neck pain    Cervical adenopathy    Diarrhea    Obesity (BMI 30-39. 9)  Resolved Problems:    * No resolved hospital problems. *        Assessment/Plan:   Check MRI Brain w/ and w/o contrast for syncope  Check MRI Cervical spine w/ and w/o contrast for neck pain  Check Echo for syncope  Check C Diff and GI pathogens  IVF  Check EEG for syncope  Check B12, TSH, HgA1C, serial troponin  Add flow cytometry to ED LP CSF  GI consult for diarrhea  Neuro consult for syncope/HA  Cardio consult for syncope  Oncology consult for possible lymphoma  ENT consult for cervical LAD  Gyn consult for dyspareunia  CLD, NPO p MN for possible scope with ENT or GI  Check EBV Ab      DVT prophylaxis Lovenox  Code status Full code  Diet CLD, NPO p MN  IV access Peripheral   Perez Catheter No    Admit as inpatient. I anticipate hospitalization spanning more than two midnights for investigation and treatment of the above medically necessary diagnoses.     Discussed with patient and fiancee. Unclear if this is infectious or malignancy or ? Will see what everyone thinks.     Luciano Clark MD    9/26/2022 6:04 PM

## 2022-09-26 NOTE — ACP (ADVANCE CARE PLANNING)
Advanced Care Planning Note. Purpose of Encounter: Advanced care planning in light of syncope  Parties In Attendance: Patient,  thiago  Decisional Capacity: Yes  Subjective: Patient with HA, diarrhea, syncope, L abdominal pain and neck pain  Objective: Cr 0.7  Goals of Care Determination: Patient wants full support (CPR, vent, surgery, HD, trach, PEG)  Plan:  IVF, MRI Brain and C spine, EEG, Echo, Neuro/GI/Gyn/Cardio/ENT/Onc consults  Code Status: Full code   Time spent on Advanced care Plannin minutes  Advanced Care Planning Documents: Completed advanced directives on chart, thiago is the POA.     Jossy Hayden MD  2022 6:23 PM

## 2022-09-26 NOTE — ED PROVIDER NOTES
[address] [facility]        Pt Name: Linh Harmon  MRN: [id number]  [place] 1990  Date of evaluation: 9/26/2022  Provider: Cheikh Crystal PA-C  PCP: Valencia Wasserman DO  Note Started: 11:27 AM EDT        I have seen and evaluated this patient with my supervising physician Doris Hedrick, [address] [place] [address]       Chief Complaint   Patient presents with    Nausea     Had a CT with contrast this morning at 730. Noticed a migraine on the way home, laid down in her car for approx 40 minutes, became dizzy, lightheaded, blacked out and woke up vomiting and urinated on herself. States never had a reaction to contrast dye previously. Now starting to have severe flank pain bilat. HISTORY OF PRESENT ILLNESS   (Location, Timing/Onset, Context/Setting, Quality, Duration, Modifying Factors, Severity, Associated Signs and Symptoms)  Note limiting factors. Chief Complaint: Migraine headache, syncope, lightheadedness, dizziness    Linh Harmon is a 29 y.o. female who presents with a history of asthma, DVT and PE who presents to the emergency department today via ambulance with several complaints. Patient states she had a CT scan of her abdomen and pelvis with contrast completed at 7:30 AM this morning. She states she had this done because her family doctor is ruling out lymphoma. She states she drove back to [place] and and around 8:20 AM she began having a headache. She states this gradually worsened to the point where she thought if she pulled her car over and took a nap that headache would improve. She states she slept for about 40 minutes in her car when she woke up and the headache was worse. She tried to drive for about 5 minutes but states she kept \"blacking out\" to the point where she had to pull her car over. She states she felt very lightheaded and dizzy.   She states she vomited and urinated on herself at which time she called 911.  Patient is alert and oriented x3 with GCS 15 on arrival.  She describes her headache as an achy, constant, 10/10 pain that localizes to the back of her head and radiates down her neck. She denies palliative or provocative things. She denies lightheadedness or dizziness at the time of my exam.  Patient denies having any numbness or tingling or weakness in her extremities. She denies chest pain or shortness of breath. She denies abdominal pain but states she did start having a left-sided back pain while pulling into the emergency department. This has been intermittent. She denies any recent diarrhea or constipation. She has no urinary complaints and denies vaginal pain, discharge or bleeding. Her LMP was 8 days ago      Nursing Notes were all reviewed and agreed with or any disagreements were addressed in the HPI. REVIEW OF SYSTEMS    (2-9 systems for level 4, 10 or more for level 5)     Review of Systems   Constitutional:  Negative for chills and fever. Respiratory:  Negative for cough, chest tightness and shortness of breath. Cardiovascular:  Negative for chest pain, palpitations and leg swelling. Gastrointestinal:  Positive for nausea and vomiting. Negative for abdominal pain and diarrhea. Genitourinary:  Positive for flank pain. Negative for dysuria, frequency, hematuria, pelvic pain, urgency, vaginal bleeding, vaginal discharge and vaginal pain. Musculoskeletal:  Positive for neck pain. Negative for arthralgias and neck stiffness. Neurological:  Positive for dizziness, syncope, light-headedness and headaches. Negative for tremors, seizures, facial asymmetry, speech difficulty and numbness. All other systems reviewed and are negative. Positives and Pertinent negatives as per HPI. Except as noted above in the ROS, all other systems were reviewed and negative.        PAST MEDICAL HISTORY     Past Medical History:   Diagnosis Date    Asthma     DVT (deep venous thrombosis) ([facility] [address] [facility] General: Normal range of motion. Cervical back: Normal range of motion and neck supple. Skin:     General: Skin is warm and dry. Coloration: Skin is not pale. Neurological:      General: No focal deficit present. Mental Status: She is alert and oriented to person, place, and time. GCS: GCS eye subscore is 4. GCS verbal subscore is 5. GCS motor subscore is 6. Sensory: Sensation is intact. Motor: Motor function is intact. Coordination: Coordination is intact. Psychiatric:         Behavior: Behavior normal. Behavior is cooperative. DIAGNOSTIC RESULTS   LABS:    Labs Reviewed   CBC WITH AUTO DIFFERENTIAL - Abnormal; Notable for the following components:       Result Value    Lymphocytes Absolute 0.4 (*)     All other components within normal limits   COMPREHENSIVE METABOLIC PANEL - Abnormal; Notable for the following components:    Glucose 104 (*)     All other components within normal limits   GLUCOSE, CSF - Abnormal; Notable for the following components:    Appearance, CSF Hazy (*)     All other components within normal limits   PROTEIN, CSF - Abnormal; Notable for the following components:    Protein, CSF 46 (*)     All other components within normal limits   CELL COUNT WITH DIFFERENTIAL, CSF - Abnormal; Notable for the following components:    Color, CSF Pink (*)     WBC, CSF 21 (*)     RBC, CSF 8400 (*)     Neutrophils, CSF 76 (*)     Lymphs, CSF 21 (*)     Monocytes, CSF 2 (*)     All other components within normal limits   CELL COUNT WITH DIFFERENTIAL, CSF - Abnormal; Notable for the following components:    RBC, CSF <1000 (*)     All other components within normal limits   LIPASE   HCG, SERUM, QUALITATIVE   URINALYSIS WITH REFLEX TO CULTURE   TROPONIN   PROTIME-INR       When ordered only abnormal lab results are displayed. All other labs were within normal range or not returned as of this dictation. EKG:  When ordered, EKG's are interpreted by the Emergency Department Physician in the absence of a cardiologist.  Please see their note for interpretation of EKG. RADIOLOGY:   Non-plain film images such as CT, Ultrasound and MRI are read by the radiologist. Plain radiographic images are visualized and preliminarily interpreted by the ED Provider with the below findings:        Interpretation per the Radiologist below, if available at the time of this note:    NM LUNG VENT/PERFUSION (VQ)   Final Result   Low probability for pulmonary embolus. CT HEAD WO CONTRAST   Final Result   No acute intracranial abnormality. XR CHEST PORTABLE   Final Result   No radiographic evidence of acute pulmonary disease. PROCEDURES   Unless otherwise noted below, none     Procedures    CRITICAL CARE TIME   Critical Care  There was a high probability of life-threatening clinical deterioration in the patient's condition requiring my urgent intervention. Total critical care time with the patient was 35 minutes excluding separately reportable procedures.        CONSULTS:  None      EMERGENCY DEPARTMENT COURSE and DIFFERENTIAL DIAGNOSIS/MDM:   Vitals:    Vitals:    09/26/22 1549 09/26/22 1604 09/26/22 1619 09/26/22 1634   BP: (!) 106/58 115/69 105/64 102/65   Pulse: 92  85 93   Resp: 23  16 15   Temp:       TempSrc:       SpO2:       Weight:       Height:           Patient was given the following medications:  Medications   sodium chloride flush 0.9 % injection 10 mL (10 mLs IntraVENous Given 9/26/22 1432)   0.9 % sodium chloride bolus (1,000 mLs IntraVENous New Bag 9/26/22 1159)   ondansetron (ZOFRAN) injection 4 mg (4 mg IntraVENous Given 9/26/22 1154)   acetaminophen (TYLENOL) tablet 1,000 mg (1,000 mg Oral Given 9/26/22 1232)   0.9 % sodium chloride bolus (1,000 mLs IntraVENous New Bag 9/26/22 1459)   xenon xe 107 gas 14 millicurie (14 millicuries Inhalation Given 9/26/22 1432)   technetium albumin aggregated (MAA) solution 3.01 millicurie (2.66 millicuries IntraVENous Given 9/26/22 1432)             Patient presented today via ambulance after having a syncopal episode with an atypical migraine. This all began after having a CT scan of her abdomen and pelvis with contrast.  Patient does work in healthcare field and is concerned this could be related to IV contrast.  She was alert and oriented x3 with GCS 15 on arrival to the emergency department. She did have an episode of vomiting as well as urinary incontinence, which is not normal for her. She denies ever having chest pain or shortness of breath. EKG shows no signs of acute ischemia or infarction. Troponin <0.01. Chest x-ray shows no acute cardiopulmonary disease. Patient does have a history of DVT and PE. A VQ scan is completed which shows low probability of PE. A CT scan of her head shows no acute intracranial abnormality. CBC is without leukocytosis or anemia. BMP, LFTs and lipase are unremarkable. Urinalysis shows no signs of infection. Qualitative hCG is negative. Despite several liters of fluid she remains tachycardic. The attending physician did a lumbar puncture with results pending. I feel it would be in the patient's best interest to be admitted to the hospital for further evaluation and treatment. I did consult the hospitalist who is agreeable with admission. FINAL IMPRESSION      1. Syncope and collapse    2. Atypical migraine    3. Tachycardia          DISPOSITION/PLAN   DISPOSITION Decision To Admit 09/26/2022 04:39:03 PM      PATIENT REFERRED TO:  No follow-up provider specified.     DISCHARGE MEDICATIONS:  New Prescriptions    No medications on file       DISCONTINUED MEDICATIONS:  Discontinued Medications    No medications on file              (Please note that portions of this note were completed with a voice recognition program.  Efforts were made to edit the dictations but occasionally words are mis-transcribed.)    Carina Dsouza PA-C (electronically signed) Ryan Romo PA-C  09/26/22 1701

## 2022-09-26 NOTE — ED PROVIDER NOTES
Lumbar Puncture    Date/Time: 9/26/2022 2:55 PM  Performed by: Bud Lim MD  Authorized by: Bud Lim MD     Consent:     Consent obtained:  Written    Consent given by:  Patient    Risks discussed:  Bleeding, infection, pain, headache, nerve damage and repeat procedure    Alternatives discussed:  No treatment, delayed treatment and alternative treatment  Universal protocol:     Patient identity confirmed:  Arm band and hospital-assigned identification number  Pre-procedure details:     Procedure purpose:  Diagnostic    Preparation: Patient was prepped and draped in usual sterile fashion    Anesthesia:     Anesthesia method:  Local infiltration    Local anesthetic:  Lidocaine 1% w/o epi  Procedure details:     Lumbar space:  L3-L4 interspace    Patient position:  Sitting    Needle gauge:  20    Needle type:  Spinal needle - Quincke tip    Needle length (in):  3.5    Ultrasound guidance: no      Number of attempts:  2    Fluid appearance:  Blood-tinged then clearing    Tubes of fluid:  4    Total volume (ml):  8  Post-procedure details:     Puncture site:  Adhesive bandage applied and direct pressure applied    Procedure completion:  Tolerated well, no immediate complications       Bud Lim MD  09/26/22 145

## 2022-09-27 ENCOUNTER — APPOINTMENT (OUTPATIENT)
Dept: MRI IMAGING | Age: 32
DRG: 641 | End: 2022-09-27
Payer: COMMERCIAL

## 2022-09-27 ENCOUNTER — APPOINTMENT (OUTPATIENT)
Dept: CT IMAGING | Age: 32
DRG: 641 | End: 2022-09-27
Payer: COMMERCIAL

## 2022-09-27 LAB
ANION GAP SERPL CALCULATED.3IONS-SCNC: 5 MMOL/L (ref 3–16)
BASOPHILS ABSOLUTE: 0 K/UL (ref 0–0.2)
BASOPHILS RELATIVE PERCENT: 0.6 %
BUN BLDV-MCNC: 9 MG/DL (ref 7–20)
CALCIUM SERPL-MCNC: 8.8 MG/DL (ref 8.3–10.6)
CHLORIDE BLD-SCNC: 109 MMOL/L (ref 99–110)
CO2: 25 MMOL/L (ref 21–32)
CREAT SERPL-MCNC: 0.7 MG/DL (ref 0.6–1.1)
EOSINOPHILS ABSOLUTE: 0.2 K/UL (ref 0–0.6)
EOSINOPHILS RELATIVE PERCENT: 3.8 %
ESTIMATED AVERAGE GLUCOSE: 102.5 MG/DL
FOLATE: 7.79 NG/ML (ref 4.78–24.2)
GFR AFRICAN AMERICAN: >60
GFR NON-AFRICAN AMERICAN: >60
GLUCOSE BLD-MCNC: 101 MG/DL (ref 70–99)
HBA1C MFR BLD: 5.2 %
HCT VFR BLD CALC: 33.5 % (ref 36–48)
HEMOGLOBIN: 11.1 G/DL (ref 12–16)
LACTATE DEHYDROGENASE: 174 U/L (ref 100–190)
LV EF: 60 %
LVEF MODALITY: NORMAL
LYMPHOCYTES ABSOLUTE: 1.5 K/UL (ref 1–5.1)
LYMPHOCYTES RELATIVE PERCENT: 27.6 %
MCH RBC QN AUTO: 29.1 PG (ref 26–34)
MCHC RBC AUTO-ENTMCNC: 33.2 G/DL (ref 31–36)
MCV RBC AUTO: 87.7 FL (ref 80–100)
MONOCYTES ABSOLUTE: 0.6 K/UL (ref 0–1.3)
MONOCYTES RELATIVE PERCENT: 11 %
NEUTROPHILS ABSOLUTE: 3.2 K/UL (ref 1.7–7.7)
NEUTROPHILS RELATIVE PERCENT: 57 %
PDW BLD-RTO: 13.8 % (ref 12.4–15.4)
PLATELET # BLD: 211 K/UL (ref 135–450)
PMV BLD AUTO: 8.9 FL (ref 5–10.5)
POTASSIUM REFLEX MAGNESIUM: 4.3 MMOL/L (ref 3.5–5.1)
RBC # BLD: 3.82 M/UL (ref 4–5.2)
SODIUM BLD-SCNC: 139 MMOL/L (ref 136–145)
TROPONIN: <0.01 NG/ML
TROPONIN: <0.01 NG/ML
TSH REFLEX: 0.59 UIU/ML (ref 0.27–4.2)
VITAMIN B-12: 472 PG/ML (ref 211–911)
WBC # BLD: 5.6 K/UL (ref 4–11)

## 2022-09-27 PROCEDURE — 6360000002 HC RX W HCPCS: Performed by: INTERNAL MEDICINE

## 2022-09-27 PROCEDURE — 99222 1ST HOSP IP/OBS MODERATE 55: CPT | Performed by: STUDENT IN AN ORGANIZED HEALTH CARE EDUCATION/TRAINING PROGRAM

## 2022-09-27 PROCEDURE — 99222 1ST HOSP IP/OBS MODERATE 55: CPT | Performed by: PSYCHIATRY & NEUROLOGY

## 2022-09-27 PROCEDURE — 80048 BASIC METABOLIC PNL TOTAL CA: CPT

## 2022-09-27 PROCEDURE — 99253 IP/OBS CNSLTJ NEW/EST LOW 45: CPT | Performed by: INTERNAL MEDICINE

## 2022-09-27 PROCEDURE — A9577 INJ MULTIHANCE: HCPCS | Performed by: PSYCHIATRY & NEUROLOGY

## 2022-09-27 PROCEDURE — 85025 COMPLETE CBC W/AUTO DIFF WBC: CPT

## 2022-09-27 PROCEDURE — 71250 CT THORAX DX C-: CPT

## 2022-09-27 PROCEDURE — 93306 TTE W/DOPPLER COMPLETE: CPT

## 2022-09-27 PROCEDURE — 6370000000 HC RX 637 (ALT 250 FOR IP): Performed by: INTERNAL MEDICINE

## 2022-09-27 PROCEDURE — 2580000003 HC RX 258: Performed by: INTERNAL MEDICINE

## 2022-09-27 PROCEDURE — 6360000004 HC RX CONTRAST MEDICATION: Performed by: PSYCHIATRY & NEUROLOGY

## 2022-09-27 PROCEDURE — 36415 COLL VENOUS BLD VENIPUNCTURE: CPT

## 2022-09-27 PROCEDURE — 1200000000 HC SEMI PRIVATE

## 2022-09-27 PROCEDURE — 95819 EEG AWAKE AND ASLEEP: CPT

## 2022-09-27 PROCEDURE — 95816 EEG AWAKE AND DROWSY: CPT | Performed by: PSYCHIATRY & NEUROLOGY

## 2022-09-27 PROCEDURE — 83615 LACTATE (LD) (LDH) ENZYME: CPT

## 2022-09-27 PROCEDURE — 84484 ASSAY OF TROPONIN QUANT: CPT

## 2022-09-27 PROCEDURE — 70553 MRI BRAIN STEM W/O & W/DYE: CPT

## 2022-09-27 PROCEDURE — 72156 MRI NECK SPINE W/O & W/DYE: CPT

## 2022-09-27 PROCEDURE — 94760 N-INVAS EAR/PLS OXIMETRY 1: CPT

## 2022-09-27 RX ORDER — AMOXICILLIN AND CLAVULANATE POTASSIUM 875; 125 MG/1; MG/1
1 TABLET, FILM COATED ORAL EVERY 12 HOURS SCHEDULED
Status: DISCONTINUED | OUTPATIENT
Start: 2022-09-27 | End: 2022-09-28 | Stop reason: HOSPADM

## 2022-09-27 RX ADMIN — GADOBENATE DIMEGLUMINE 18 ML: 529 INJECTION, SOLUTION INTRAVENOUS at 20:57

## 2022-09-27 RX ADMIN — AMOXICILLIN AND CLAVULANATE POTASSIUM 1 TABLET: 875; 125 TABLET, FILM COATED ORAL at 21:18

## 2022-09-27 RX ADMIN — SODIUM CHLORIDE, PRESERVATIVE FREE 10 ML: 5 INJECTION INTRAVENOUS at 21:18

## 2022-09-27 RX ADMIN — AMOXICILLIN AND CLAVULANATE POTASSIUM 1 TABLET: 875; 125 TABLET, FILM COATED ORAL at 11:32

## 2022-09-27 RX ADMIN — SODIUM CHLORIDE, PRESERVATIVE FREE 10 ML: 5 INJECTION INTRAVENOUS at 11:34

## 2022-09-27 RX ADMIN — ACETAMINOPHEN 650 MG: 325 TABLET ORAL at 05:55

## 2022-09-27 RX ADMIN — ACETAMINOPHEN 650 MG: 325 TABLET ORAL at 14:05

## 2022-09-27 RX ADMIN — ENOXAPARIN SODIUM 40 MG: 100 INJECTION SUBCUTANEOUS at 11:32

## 2022-09-27 RX ADMIN — SODIUM CHLORIDE: 9 INJECTION, SOLUTION INTRAVENOUS at 11:39

## 2022-09-27 ASSESSMENT — PAIN SCALES - GENERAL
PAINLEVEL_OUTOF10: 0
PAINLEVEL_OUTOF10: 0
PAINLEVEL_OUTOF10: 8
PAINLEVEL_OUTOF10: 0
PAINLEVEL_OUTOF10: 8

## 2022-09-27 ASSESSMENT — PAIN DESCRIPTION - DESCRIPTORS
DESCRIPTORS: ACHING
DESCRIPTORS: ACHING;DISCOMFORT
DESCRIPTORS: ACHING;STABBING

## 2022-09-27 ASSESSMENT — PAIN DESCRIPTION - LOCATION
LOCATION: HEAD
LOCATION: HEAD
LOCATION: HEAD;NECK
LOCATION: HEAD;NECK

## 2022-09-27 ASSESSMENT — PAIN DESCRIPTION - ONSET: ONSET: ON-GOING

## 2022-09-27 ASSESSMENT — PAIN DESCRIPTION - PAIN TYPE: TYPE: ACUTE PAIN

## 2022-09-27 ASSESSMENT — PAIN DESCRIPTION - ORIENTATION: ORIENTATION: ANTERIOR;INNER

## 2022-09-27 ASSESSMENT — PAIN DESCRIPTION - FREQUENCY: FREQUENCY: CONTINUOUS

## 2022-09-27 NOTE — PLAN OF CARE
Problem: Pain  Goal: Verbalizes/displays adequate comfort level or baseline comfort level  Outcome: Progressing     Problem: Hematologic - Adult  Goal: Maintains hematologic stability  Outcome: Progressing     Vitals:    09/26/22 2345   BP: 117/78   Pulse: 74   Resp: 16   Temp: 99 °F (37.2 °C)   SpO2: 98%     VS obtained at 2345 & Tylenol given at 2353 per pt request for c/o frontal headache. See [facility] & all flowsheets. Will continue to monitor.

## 2022-09-27 NOTE — PROCEDURES
Patient: Roxanne Calhoun    MR Number: [id number]  YOB: 1990  Date of Visit: 9/27/2022    Clinical History:  The patient is a 29y.o. years old female with recurrent syncope. Method: The EEG was performed utilizing the international 10/20 of electrode placements of both referential and bipolar montages. The patient was awake and drowsy through out the recording. Photic stimulation was performed. Findings: The background of the EEG showed normal alpha posterior background of 9-10 - HZ and amplitude of 20-40 UV. This background was symmetric, waxing and waning, and reactive with eye opening and closure. As the patient became drowsy, generalized diffuse slowing was seen through recording at 6-7 HZ. This generalized slowing was symmetric, non rhythmical, and continuous. No spike or sharp waves were seen. Photic stimulation did not activate EEG. Impression: This EEG  is within normal limits. There is no evidence of epileptiform discharges, focal, or lateralizing abnormalities.       Jeison Hutchins MD      Board certified in clinical neurophysiology

## 2022-09-27 NOTE — CONSULTS
Gastroenterology Consult Note        Patient: Gertrudis Juarez  : 1990  Acct#:      Date:  2022    Subjective:       History of Present Illness  Patient is a 29 y.o.  female admitted with Syncope and collapse [R55]  Atypical migraine [G43.009]  Tachycardia [R00.0] who is seen in consult for diarrhea. History of asthma, DVT and PE. She had swollen lymph nodes and underwent CT of the head and neck outpatient yesterday. About an hour later she was driving and felt lightheaded so pulled over and had syncopal episode. Was then brought to the ER. Had headache and neck pain so lumbar puncture was done in the ER. She is undergoing neuro work-up. GI consulted for diarrhea. She reports a change in her bowel habits a couple months ago. Will have diarrhea several times a day for a week and then has solid bowel movements for several days. Then will have diarrhea again. No abdominal pain. No nocturnal diarrhea. No bloody bowel movements. Past Medical History:   Diagnosis Date    Asthma     DVT (deep venous thrombosis) (HCC)     Headache 2022    Pulmonary embolism (HCC)       Past Surgical History:   Procedure Laterality Date     SECTION      LIPOSUCTION        Past Endoscopic History: none    Admission Meds  No current facility-administered medications on file prior to encounter.      Current Outpatient Medications on File Prior to Encounter   Medication Sig Dispense Refill    Norelgestromin-Eth Estradiol Shalini Salazar TD) Place onto the skin             Allergies  Allergies   Allergen Reactions    Diphenhydramine     Lamotrigine       Social   Social History     Tobacco Use    Smoking status: Never    Smokeless tobacco: Never   Substance Use Topics    Alcohol use: Never        Family History   Problem Relation Age of Onset    Hypertension Mother     Hypertension Father     Breast Cancer Maternal Aunt     Ovarian Cancer Maternal Aunt     Ovarian Cancer Maternal Aunt Review of Systems  Constitutional: negative for fevers, chills, sweats    Ears, nose, mouth, throat, and face: negative for nasal congestion and sore throat   Respiratory: negative for cough and shortness of breath   Cardiovascular: negative for chest pain and dyspnea   Gastrointestinal: see hpi   Genitourinary:negative for dysuria and frequency   Integument/breast: negative for pruritus and rash   Hematologic/lymphatic: negative for bleeding and easy bruising   Musculoskeletal:negative for arthralgias and myalgias   Neurological: negative for dizziness and weakness + HA       Physical Exam  Blood pressure 104/67, pulse 65, temperature 98.5 °F (36.9 °C), temperature source Oral, resp. rate 14, height 5' 6\" (1.676 m), weight 203 lb (92.1 kg), last menstrual period 09/19/2022, SpO2 98 %. General appearance: alert, cooperative, no distress, appears stated age  Eyes: Anicteric  Head: Normocephalic, without obvious abnormality  Lungs: clear to auscultation bilaterally, Normal Effort  Heart: regular rate and rhythm, normal S1 and S2, no murmurs or rubs  Abdomen: soft, non-tender. Bowel sounds normal. No masses,  no organomegaly. Extremities: atraumatic, no cyanosis or edema  Skin: warm and dry, no jaundice  Neuro: Grossly intact, A&OX3  Musculoskeletal: 5/5  strength BUE      Data Review:    Recent Labs     09/26/22  1127 09/27/22  0734   WBC 5.2 5.6   HGB 13.0 11.1*   HCT 39.5 33.5*   MCV 87.6 87.7    211     Recent Labs     09/26/22  1127 09/27/22  0734    139   K 3.9 4.3    109   CO2 23 25   BUN 14 9   CREATININE 0.7 0.7     Recent Labs     09/26/22  1127   AST 19   ALT 17   BILITOT 0.5   ALKPHOS 108     Recent Labs     09/26/22  1127   LIPASE 15.0     Recent Labs     09/26/22  1127   PROTIME 13.4   INR 1.02     No results for input(s): PTT in the last 72 hours. No results for input(s): OCCULTBLD in the last 72 hours.                       Assessment:     Diarrhea -patient with change in bowel habits about 2 months ago. Has loose stools several times a day for a week then will have solid bowel movements. No hematochezia. Syncope -neuro evaluating. To have echo. Cervical adenopathy -per ENT. Recommendations:   -Check stool for C.diff, GI bacterial pathogens PCR, and EIA for parasites   -Check fecal calprotectin and fecal elastase  -If above negative, would need EGD and colonoscopy at some point, either inpatient or outpatient.     Discussed with Dr. Elizabet Cantrell PA-C  [facility]    Is a very pleasant 82-year-old female who was seen today at the bedside with certified physicians assistant    Patient was admitted because she had a syncopal episode but she is also been having problems with diarrhea    It appears that she has attacks that could last several days in which she has crampy diarrheal stools  She will feel better for a few days and the crampy diarrheal stools will come back    Reports seeing no blood when she has bowel movements she also reports not waking up in the middle of the night    So we were asked to see and evaluate her for the situation  Would like to do a colonoscopy at some point  But with her syncopal episode she is being worked up by multiple services  Since she has no red flag issues going on such as bloody stools or waking up in the middle the night to have a bowel movement  Her current plan would be to check some stool sample  If her other work-up is normal then try to do an upper and lower endoscopy this could either be during her hospital stay if the patient wants or this could be done as an outpatient    We will continue to follow her thank you for this very kind referral      Luis Daniel Bailey MD

## 2022-09-27 NOTE — CONSULTS
[facility]   CONSULT NOTE    2022 10:49 AM    Patient Information: Rose Marie Albarran   Date of Admit:  2022  Primary Care Physician:  Pema Contreras DO  Requesting Physician:  Mitra Nino MD    Reason for consult:   Evaluation and recommendations for neck lymphadenopathy    Chief complaint:    Chief Complaint   Patient presents with    Nausea     Had a CT with contrast this morning at 730. Noticed a migraine on the way home, laid down in her car for approx 40 minutes, became dizzy, lightheaded, blacked out and woke up vomiting and urinated on herself. States never had a reaction to contrast dye previously. Now starting to have severe flank pain bilat. History of Present Illness:  Rose Marie Albarran is a 29 y.o. female on Mitra Nino MD service who was admitted on 2022 for syncope which occurred after having a contrast CT of her neck. She presented to the ER with nausea, vomiting, urine incontinence, syncope, headache and neck pain. She has been noticing lymphadenopathy in L supraclavilar area, neck and occipital areas for past 4-5 months. Saw Dr Bj Jj (ENT) who ordered the outpatient CT Neck with contrast.  Patient had [facility] Neck in [facility] yesterday and 40 minutes later could not drive as she was dizzy and lightheaded. No fecal incontinence. No CP, SOB, palpitations or aura. Says she had diarrhea for about 7 days. She reports fatigue for the past 6 months, denies fever, night sweats, or weight loss. She c/o occasional LLQ pain and dyspareunia, she has not seen her gynecologist.      Past Medical History:     has a past medical history of Asthma, DVT (deep venous thrombosis) ([address] Headache, and Pulmonary embolism ([address]      Past Surgical History:    Past Surgical History:   Procedure Laterality Date     SECTION      LIPOSUCTION          Current Medications:     sodium chloride flush  5-40 mL IntraVENous 2 times per day    enoxaparin  40 mg SubCUTAneous Daily Allergies: Allergies   Allergen Reactions    Diphenhydramine     Lamotrigine         Social History:    reports that she has never smoked. She has never used smokeless tobacco. She reports that she does not drink alcohol and does not use drugs. Family History:     family history includes Breast Cancer in her maternal aunt; Hypertension in her father and mother; Ovarian Cancer in her maternal aunt and maternal aunt. ROS:    Constitutional:  Negative for fever, chills or night sweats  Eyes:  Negative for exudate, itching  Ears:  Negative for drainage   Nose:  Negative for rhinorrhea, epistaxis  Mouth/Throat:  Negative for hoarseness, sore throat. Respiratory:   Negative for shortness of breath, hemoptysis, wheezing  Cardiovascular: Negative for chest pain, palpitations   Gastrointestinal:  Negative for nausea, vomiting, diarrhea, black stool, bright red blood in the stool  Genitourinary:  Negative for dysuria, hematuria   Hematologic/Lymphatic:  Negative for  bleeding tendency, easy bruising  Musculoskeletal:  Negative for myalgias, arthralgias  Neurologic:  Negative for  confusion,dysarthria. Skin :  Negative for itching, rash  Psychiatric:  Negative for depression, anxiety. Endocrine:  Negative for polydipsia, polyuria, heat /cold intolerance. PHYSICAL EXAM:    Vitals:  Vitals:    09/27/22 0700   BP: 109/73   Pulse: 77   Resp: 14   Temp: 98.5 °F (36.9 °C)   SpO2: 98%        Intake/Output Summary (Last 24 hours) at 9/27/2022 1049  Last data filed at 9/27/2022 0640  Gross per 24 hour   Intake 764.27 ml   Output 900 ml   Net -135.73 ml      Wt Readings from Last 3 Encounters:   09/26/22 203 lb (92.1 kg)   09/16/22 211 lb 6.4 oz (95.9 kg)   09/16/22 212 lb 3.2 oz (96.3 kg)        General appearance: Appears comfortable.  Well nourished  Eyes: Sclera clear, pupils equal  ENT: Moist mucus membranes, no thrush  Neck: Trachea midline, symmetrical, palpable left supraclavicular node, and right posterior occipital node. Cardiovascular: Regular rhythm, normal S1, S2. No murmur, gallop, rub. No edema in  lower extremities  Respiratory: Clear to auscultation bilaterally. No wheeze. Good inspiratory effort  Gastrointestinal: Abdomen soft, not tender, not distended, normal bowel sounds  Musculoskeletal: No cyanosis in digits, warm extremities  Neurologic: Cranial nerves grossly intact, no motor or speech deficits. Psychiatric: Normal affect. Alert and oriented to time, place and person. Skin: Warm, dry, normal turgor, no rash    DATA:  CBC:   Lab Results   Component Value Date/Time    WBC 5.6 09/27/2022 07:34 AM    RBC 3.82 09/27/2022 07:34 AM    HGB 11.1 09/27/2022 07:34 AM    HCT 33.5 09/27/2022 07:34 AM    MCV 87.7 09/27/2022 07:34 AM    MCH 29.1 09/27/2022 07:34 AM    MCHC 33.2 09/27/2022 07:34 AM    RDW 13.8 09/27/2022 07:34 AM     09/27/2022 07:34 AM    MPV 8.9 09/27/2022 07:34 AM     BMP:  Lab Results   Component Value Date/Time     09/27/2022 07:34 AM    K 4.3 09/27/2022 07:34 AM     09/27/2022 07:34 AM    CO2 25 09/27/2022 07:34 AM    BUN 9 09/27/2022 07:34 AM    CREATININE 0.7 09/27/2022 07:34 AM    CALCIUM 8.8 09/27/2022 07:34 AM    GFRAA >60 09/27/2022 07:34 AM    LABGLOM >60 09/27/2022 07:34 AM    GLUCOSE 101 09/27/2022 07:34 AM     Magnesium: No results found for: MG  LIVER PROFILE:   Recent Labs     09/26/22  1127   AST 19   ALT 17   LIPASE 15.0   BILITOT 0.5   ALKPHOS 108     PT/INR:    Lab Results   Component Value Date/Time    PROTIME 13.4 09/26/2022 11:27 AM    INR 1.02 09/26/2022 11:27 AM       IMPRESSION/RECOMMENDATIONS:    Principal Problem:    Syncope and collapse  Active Problems:    Urine incontinence    Dehydration    Headache    Neck pain    Cervical adenopathy    Diarrhea    Obesity (BMI 30-39. 9)  Resolved Problems:    * No resolved hospital problems.  *       Cervical neck adenopathy  - CT neck showing borderline enlarged bilateral cervical and borderline left supraclavicular node  - Seen by ENT today, recommends oral antibiotics  - may need bx if adenopathy not improved with antibiotics  - EBV labs pending  - check LDH      This plan was discussed with the patient and he/she verbalized understanding. Thank you for allowing us to participate in the care of this patient. Zella Schlatter, ARNULFO  [facility]      5 month duration of small palpable lymph nodes along with fatigue unclear etiology. LDH normal, highly unlikely to have an aggressive lymphoma. Will get CT scans of the chest abdomen and pelvis without contrast given suspected acute contrast allergy.  Will need follow up with me as an outpatient for continued monitoring if progressive increase in size of any of the borderline lymph nodes will need excisional biopsy    Miller Contreras MD  [facility]

## 2022-09-27 NOTE — CONSULTS
In patient Neurology consult        [facility]      MD Josefina Nuñeze Lunch  1990    Date of Service: 2022    Referring Physician: Francisca Stewart MD      Reason for the consult and CC: New onset syncope    HPI:   The patient is a 29y.o.  years old female with multiple medical problems who was admitted to the hospital yesterday with new onset syncope. She went to [place] to get her a CT of the neck with contrast for cervical lymphadenopathy. Patient drove home and on her way, she had severe nausea, vomiting followed by lightheadedness, dizziness, visual disturbance and brief LOC. She was able to pull over. No witnessed convulsion or tongue biting  but she had  bladder incontinence. Degree was severe. Duration was seconds to minutes. No other relieving or aggravating factors or clear triggers. No other associated symptoms. Patient came to the ED at [facility] for evaluation. Initial imaging with CT head showed no acute findings. She was admitted to the hospital.  Today she denies any other new symptoms. She feels back to her baseline. History of migraine with aura. She had LP in the ED giving her complaint of low-grade fever which was somewhat traumatic. No history of seizure or family history of epilepsy. Other review of system was unremarkable.       Family History   Problem Relation Age of Onset    Hypertension Mother     Hypertension Father     Breast Cancer Maternal Aunt     Ovarian Cancer Maternal Aunt     Ovarian Cancer Maternal Aunt      Past Surgical History:   Procedure Laterality Date     SECTION      LIPOSUCTION          Past Medical History:   Diagnosis Date    Asthma     DVT (deep venous thrombosis) ([address]     Headache 2022    Pulmonary embolism (HCC)      Social History     Tobacco Use    Smoking status: Never    Smokeless tobacco: Never   Vaping Use    Vaping Use: Never used   Substance Use Topics    Alcohol use: Never    Drug use: Never Allergies   Allergen Reactions    Diphenhydramine     Lamotrigine      Current Facility-Administered Medications   Medication Dose Route Frequency Provider Last Rate Last Admin    amoxicillin-clavulanate (AUGMENTIN) 875-125 MG per tablet 1 tablet  1 tablet Oral 2 times per day Kimmy Gorman MD   1 tablet at 09/27/22 1132    albuterol (PROVENTIL) nebulizer solution 0.63 mg  0.63 mg Nebulization Q6H PRN Kimmy Gorman MD        sodium chloride flush 0.9 % injection 5-40 mL  5-40 mL IntraVENous 2 times per day Kimmy Gorman MD   10 mL at 09/27/22 1134    sodium chloride flush 0.9 % injection 5-40 mL  5-40 mL IntraVENous PRN Kimmy Gorman MD        0.9 % sodium chloride infusion   IntraVENous PRN Kimmy Gorman MD        enoxaparin (LOVENOX) injection 40 mg  40 mg SubCUTAneous Daily Kimmy Gorman MD   40 mg at 09/27/22 1132    ondansetron (ZOFRAN-ODT) disintegrating tablet 4 mg  4 mg Oral Q8H PRN Kimmy Gorman MD        Or    ondansetron (ZOFRAN) injection 4 mg  4 mg IntraVENous Q6H PRN Kimmy Gorman MD        polyethylene glycol (GLYCOLAX) packet 17 g  17 g Oral Daily PRN Kimmy Gorman MD        acetaminophen (TYLENOL) tablet 650 mg  650 mg Oral Q6H PRN Kimmy Gorman MD   650 mg at 09/27/22 1405    Or    acetaminophen (TYLENOL) suppository 650 mg  650 mg Rectal Q6H PRN Kimmy Gorman MD        0.9 % sodium chloride infusion   IntraVENous Continuous Kimmy Gorman MD 75 mL/hr at 09/27/22 1139 New Bag at 09/27/22 1139    perflutren lipid microspheres (DEFINITY) injection 1.65 mg  1.5 mL IntraVENous ONCE PRN Kimmy Gorman MD           ROS : A 10-14 system review of constitutional, cardiovascular, respiratory, eyes, musculoskeletal, endocrine, GI, ENT, skin, hematological, genitourinary, psychiatric and neurologic systems was obtained and updated today and is unremarkable except as mentioned in my HPI      Exam:     Constitutional:   Vitals:    09/27/22 0700 09/27/22 1142 09/27/22 1210 09/27/22 1523   BP: 109/73 104/67  105/73   Pulse: 77 65  69   Resp: 14 14  14   Temp: 98.5 °F (36.9 °C) 98.5 °F (36.9 °C)  98.8 °F (37.1 °C)   TempSrc: Oral Oral  Oral   SpO2: 98% 98% 98% 97%   Weight:       Height:           General appearance and observation: Normal development and appear in no acute distress. Eye:  Fundus: No blurring of optic disc. Neck: supple  Cardiovascular: No lower leg edema with good pulsation. Mental Status:   Oriented to person, place, problem, and time. Memory: Good immediate recall. Intact remote memory  Normal attention span and concentration. Language: intact naming, repeating and fluency   Good fund of Knowledge. Aware of current events and vocabulary   Cranial Nerves:   II: Visual fields: Full. Pupils: equal, round, reactive to light  III,IV,VI: Extra Ocular Movements are intact. No nystagmus  V: Facial sensation is intact  VII: Facial strength and movements: intact and symmetric  VIII: Hearing: Intact  IX: Palate elevation is symmetric  XI: Shoulder shrug is intact  XII: Tongue movements are normal  Musculoskeletal: 5/5 in all 4 extremities. Tone: Normal tone. Reflexes: Symmetric 2+ in the arms and 2+ in the legs   Planters: flexor bilaterally. Coordination: no pronator drift, no dysmetria with FNF in upper extremities. Normal REM. Sensation: normal to all modalities in both arms and legs.   Gait/Posture: steady gait      Data:  LABS:   Lab Results   Component Value Date/Time     09/27/2022 07:34 AM    K 4.3 09/27/2022 07:34 AM     09/27/2022 07:34 AM    CO2 25 09/27/2022 07:34 AM    BUN 9 09/27/2022 07:34 AM    CREATININE 0.7 09/27/2022 07:34 AM    GFRAA >60 09/27/2022 07:34 AM    LABGLOM >60 09/27/2022 07:34 AM    GLUCOSE 101 09/27/2022 07:34 AM    CALCIUM 8.8 09/27/2022 07:34 AM     Lab Results   Component Value Date/Time    WBC 5.6 09/27/2022 07:34 AM    RBC 3.82 09/27/2022 07:34 AM    HGB 11.1 09/27/2022 07:34 AM    HCT 33.5 09/27/2022 07:34 AM    MCV 87.7 09/27/2022 07:34 AM    RDW 13.8 09/27/2022 07:34 AM     09/27/2022 07:34 AM     Lab Results   Component Value Date    INR 1.02 09/26/2022    PROTIME 13.4 09/26/2022       Neuroimaging was independently reviewed by myself and discussed results with the patient   Reviewed notes from different physicians  Reviewed lab and blood testing    Impression:  New onset syncope. Less likely seizure based on semiology. Could be vasovagal, arrhythmia or idiopathic. History of migraine with aura  Lymphadenopathy, cervical    Recommendation:  EEG was ordered and showed no epileptiform discharge  MRI brain is pending  Telemetry  Echo  DVT and GI prophylaxis  Continue current work-up for cervical lymphadenopathy  Discussed risk of recurrence, driving precautions and restrictions  Check orthostatics after hydration  Nothing to add from neurology at this point  Will follow if MRI shows significant changes  Please call for questions      Thank you for referring such patient. If you have any questions regarding my consult note, please don't hesitate to call me. Skinny Mora MD  [phone]    This dictation was generated by voice recognition computer software.  Although all attempts are made to edit the dictation for accuracy, there may be errors in the  transcription that are not intended

## 2022-09-27 NOTE — CARE COORDINATION
CM reviewed chart for d/c planning. Pt is from home, independent. Has multiple consults. CM will monitor for d/c needs.       Ivana Hester RN, BSN  [phone]

## 2022-09-27 NOTE — ED PROVIDER NOTES
In addition to the advanced practice provider, I personally saw Kwan Valles and performed a substantive portion of the visit including all aspects of the medical decision making. Briefly, this is a 29 y.o. female here for loss of consciousness which occurred just prior to arrival.  Patient states she underwent CT imaging of her head and neck with contrast earlier today for evaluation of swollen lymph nodes. About an hour after receiving the contrast she was driving home when she suddenly felt lightheaded with blurry vision, she vomited and lost consciousness. She had an episode of urinary incontinence. Also with associated diffuse throbbing headache and neck stiffness. She denies any history of similar symptoms. She has received IV contrast dye in the past without reaction. She has history of pulmonary embolism, was previously on Xarelto however stopped taking this over a year ago. On exam, patient afebrile and nontoxic. No distress. Heart tachycardic, regular rhythm. Lungs CTAB. Abdomen soft, nondistended, nontender to palpation in all quadrants. Neck supple, no meningismus. A&Ox4. Speech clear, face symmetric. PERRL, no nystagmus. CN 2-12 intact. 5/5 motor and sensation grossly intact all extremities. No pronator drift. Normal finger to nose, normal heel to shin. EKG  EKG was reviewed by emergency department physician in the absence of a cardiologist    Narrow complex sinus rhythm, rate 107, normal axis, normal WA and QRS intervals, normal Qtc, no ST elevations or depressions, normal t-wave morphology, impression sinus tachycardia, no STEMI, no comparison available      Screenings   Adriane Coma Scale  Eye Opening: Spontaneous  Best Verbal Response: Oriented  Best Motor Response: Obeys commands  Adriane Coma Scale Score: 15      Is this patient to be included in the SEP-1 Core Measure due to severe sepsis or septic shock?    No   Exclusion criteria - the patient is NOT to be included for SEP-1 Core Measure due to: Infection is not suspected      MDM    Patient afebrile and nontoxic. No respiratory or painful distress at time of my evaluation. She is alert and protecting her airway, nothing to suggest ongoing seizure activity. No hypoglycemia. EKG is no STEMI, troponin normal, low suspicion for ACS. No observed malignant dysrhythmia. Neuro exam is nonfocal, no findings to suggest CVA/TIA and my clinical suspicion is very low. CT head was pursued which shows no evidence of hemorrhage or mass-effect. Given history of pulmonary embolism, did proceed with further imaging, patient is concerned that her symptoms may be a reaction to contrast dye, thus VQ scan was pursued which was low risk for pulmonary embolism. Laboratory work-up overall reassuring without evidence of acute endorgan dysfunction or clinically significant electrolyte derangement. Given atypical headache, neck stiffness with syncope, did recommend lumbar puncture, risks versus benefits of this procedure were discussed with patient and she was agreeable to proceed. During this time I was taking care of another critically ill patient and Dr. Sarah Rhodes offered to perform lumbar puncture, please see his procedure note for details. CSF results not indicative of CNS infection, decreasing RBC count not suggestive of occult SAH/ICH. Unclear etiology of patient's syncope, given her neurologic symptoms and persistent tachycardia, do recommend admission for further evaluation. Case was discussed with internal medicine team who will admit. Patient was alert, hemodynamically stable and in no distress at this time. I Dr. Leanne Fleischer am the primary clinician of record. Patient Referrals:  No follow-up provider specified. Discharge Medications:  Current Discharge Medication List          FINAL IMPRESSION  1. Syncope and collapse    2. Atypical migraine    3.  Tachycardia        Blood pressure 99/63, pulse 76, temperature 99 °F (37.2 °C), temperature source Oral, resp. rate 17, height 5' 6\" (1.676 m), weight 203 lb (92.1 kg), last menstrual period 09/19/2022, SpO2 97 %. For further details of [facility] emergency department encounter, please see documentation by advanced practice provider, JUAQUIN Olmstead.     Luis Daniel Mancilla DO (electronically signed)  Attending Emergency Physician       Luis Daniel Mancilla DO  09/27/22 1038

## 2022-09-27 NOTE — CONSULTS
[facility]  [phone]      Reason for consult:  syncope    ASSESSMENT AND PLAN:    Dehydration (at admission), likely contributing to syncope  Diarrhea  Recent IV contrast with possible reaction  Syncope, unlikely to be a cardiac cause  Exacerbated by dehydration and pain (migraine and flank pain)  No clinical evidence of heart disease on exam or EKG, telemetry normal  Echo pending    No further cardiac testing is necessary unless echo is abnormal    History of Present Illness:  Rehana Donis is a 29 y.o. patient who presented to the hospital with complaints of syncope. I have been asked to provide consultation regarding further management and testing. She has had cervical LAD for over a month that is getting bigger, thus she was referred for a CT w/ IV contrast yesterday to evaluate her neck. 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 She has no preexisting history of migraines, and she has had IVP dye before without reactions. While driving, she suddenly had a severe migraine and neck pain, felt dizzy, and started having visual changes. She pulled off to the side of the road and stopped her car, then \"everything went black and I fainted. \"  She woke up without any injury and called EMS. In ER she had numerous tests done including an LP to r/o meningitis. Head CT was done, EKG, urinalysis, basic labs. UA was very concentrated c/w her NPO state. She has no prior history of fainting. She had no palpitations or chest pain prior to fainting. She doesn't have any exertional symptoms at baseline and has no known heart problems. She was adopted, but her bio mother had a CABG in her 46s after a long life of very heavy smoking. She feels fine right now.     She has had numerous other complaints other the past 1-2 months, including pain with intercourse (pain near my left ovary), diarrhea that is watery but not foul-smelling, and diffuse itching. She hasn't had fever, chills, weight loss, night sweats, joint pain, or rashes except for a small rash on the back of her neck. Past Medical History:   has a past medical history of Asthma, DVT (deep venous thrombosis) ([facility] [address] Headache, and Pulmonary embolism ([facility] [address] Surgical History:   has a past surgical history that includes  section and Liposuction. Social History:   reports that she has never smoked. She has never used smokeless tobacco. She reports that she does not drink alcohol and does not use drugs. Family History:  No family history of premature coronary artery disease, aortic disease, or valve disease. Home Medications:  Were reviewed and are listed in nursing record. and/or listed below  Prior to Admission medications    Medication Sig Start Date End Date Taking?  Authorizing Provider   Norelgestromin-Eth Estradiol Dondedra Nan TD) Place onto the skin    Historical Provider, MD        Current Medications:  Current Facility-Administered Medications   Medication Dose Route Frequency Provider Last Rate Last Admin    albuterol (PROVENTIL) nebulizer solution 0.63 mg  0.63 mg Nebulization Q6H PRN Jordan Castillo MD        sodium chloride flush 0.9 % injection 5-40 mL  5-40 mL IntraVENous 2 times per day Jordan Castillo MD   10 mL at 22    sodium chloride flush 0.9 % injection 5-40 mL  5-40 mL IntraVENous PRN Jordan Castillo MD        0.9 % sodium chloride infusion   IntraVENous PRN Jordan Castillo MD        enoxaparin (LOVENOX) injection 40 mg  40 mg SubCUTAneous Daily Jordan Castillo MD   40 mg at 22    ondansetron (ZOFRAN-ODT) disintegrating tablet 4 mg  4 mg Oral Q8H PRN Jordan Castillo MD        Or    ondansetron (ZOFRAN) injection 4 mg  4 mg IntraVENous Q6H PRN Jordan Castillo MD        polyethylene glycol (GLYCOLAX) packet 17 g  17 g Oral Daily PRN Jordan Castillo MD        acetaminophen (TYLENOL) tablet 650 mg  650 mg Oral Q6H PRN Dominique Mitchell MD   650 mg at 09/27/22 0555    Or    acetaminophen (TYLENOL) suppository 650 mg  650 mg Rectal Q6H PRN Dominique Mitchell MD        0.9 % sodium chloride infusion   IntraVENous Continuous Dominique Mitchell MD 75 mL/hr at 09/27/22 0640 Rate Verify at 09/27/22 0640    perflutren lipid microspheres (DEFINITY) injection 1.65 mg  1.5 mL IntraVENous ONCE PRN Dominique Mitchell MD            Allergies:  Diphenhydramine     Review of Systems:     All systems reviewed and negative except as stated above. Physical Examination:    Vitals:    09/27/22 0700   BP: 109/73   Pulse: 77   Resp: 14   Temp: 98.5 °F (36.9 °C)   SpO2: 98%    Weight: 203 lb (92.1 kg)       Body mass index is 32.77 kg/m².     General Appearance:  Alert, cooperative, no distress, appears stated age   Head:  Normocephalic, without obvious abnormality, atraumatic   Eyes:  PERRL, conjunctiva/corneas clear   Nose: Nares normal, no drainage or sinus tenderness   Throat: Lips, mucosa, and tongue normal   Neck: Supple, symmetrical, trachea midline, no adenopathy, thyroid: not enlarged, symmetric, no tenderness/mass/nodules, no carotid bruit or JVD   Lungs:   Clear to auscultation bilaterally, respirations unlabored   Chest Wall:  No tenderness or deformity   Heart:  Regular rate and rhythm, S1, S2 normal, no murmur, rub or gallop   Abdomen:   Soft, non-tender, bowel sounds active all four quadrants,  no masses, no organomegaly   Extremities: Extremities normal, atraumatic, no cyanosis or edema   Pulses: 2+ and symmetric   Skin: Skin color, texture, turgor normal, no rashes or lesions   Psych: Normal mood and affect   Neurologic: CN II-XII grossly intact        Labs  No results found for: PROBNP    No results found for: CHOL, TRIG, HDL, LDLCALC, LABVLDL    Troponin   Date/Time Value Ref Range Status   09/27/2022 07:34 AM <0.01 <0.01 ng/mL Final     Comment:     Methodology by Troponin T   09/27/2022 01:15 AM <0.01 <0.01 ng/mL Final     Comment: Methodology by Troponin T   09/26/2022 07:03 PM <0.01 <0.01 ng/mL Final     Comment:     Methodology by Troponin T       CBC:   Lab Results   Component Value Date/Time    WBC 5.6 09/27/2022 07:34 AM    RBC 3.82 09/27/2022 07:34 AM    HGB 11.1 09/27/2022 07:34 AM    HCT 33.5 09/27/2022 07:34 AM    MCV 87.7 09/27/2022 07:34 AM    RDW 13.8 09/27/2022 07:34 AM     09/27/2022 07:34 AM     CMP:    Lab Results   Component Value Date/Time     09/27/2022 07:34 AM    K 4.3 09/27/2022 07:34 AM     09/27/2022 07:34 AM    CO2 25 09/27/2022 07:34 AM    BUN 9 09/27/2022 07:34 AM    CREATININE 0.7 09/27/2022 07:34 AM    GFRAA >60 09/27/2022 07:34 AM    AGRATIO 1.1 09/26/2022 11:27 AM    LABGLOM >60 09/27/2022 07:34 AM    GLUCOSE 101 09/27/2022 07:34 AM    PROT 7.6 09/26/2022 11:27 AM    CALCIUM 8.8 09/27/2022 07:34 AM    BILITOT 0.5 09/26/2022 11:27 AM    ALKPHOS 108 09/26/2022 11:27 AM    AST 19 09/26/2022 11:27 AM    ALT 17 09/26/2022 11:27 AM     PT/INR:  No results found for: PTINR  Lab Results   Component Value Date    TROPONINI <0.01 09/27/2022      Latest Reference Range & Units Most Recent   Color, UA Straw/Yellow  Yellow  9/26/22 11:27   Clarity, UA Clear  Clear  9/26/22 11:27   Glucose, UA Negative mg/dL Negative  9/26/22 11:27   Bilirubin, Urine Negative  Negative  9/26/22 11:27   Ketones, Urine Negative mg/dL Negative  9/26/22 11:27   Specific Gravity, UA 1.005 - 1.030  >=1.030  9/26/22 11:27   Blood, Urine Negative  Negative  9/26/22 11:27   pH, UA 5.0 - 8.0  5.0  9/26/22 11:27   Protein, UA Negative mg/dL Negative  9/26/22 11:27   Urobilinogen, Urine <2.0 E.U./dL 0.2  9/26/22 11:27   Nitrite, Urine Negative  Negative  9/26/22 11:27   Leukocyte Esterase, Urine Negative  Negative  9/26/22 11:27   Urine Type  NotGiven  9/26/22 11:27   Urine Reflex to Culture  Not Indicated  9/26/22 11:27     CXR - normal, no cardiomegaly or pulmonary edema    Head CT - normal    EKG:  I have reviewed EKG with the following interpretation:  Impression:      Sinus tachycardia  Rsr' in V1 probably normal variant  Possible Left atrial enlargement  Borderline ECG    Old notes reviewed  Telemetry reviewd  Ekg personally reviewed  Chest xray personally reviewed  Medications and labs reviewed    Moderate complexity/medical decision making due to extensive data review, extensive history review, independent review of data    Moderate risk due to acute illness, evaluation of drug-drug interactions, medication management and diagnostic interventions    I will address the patient's cardiac risk factors and adjusted pharmacologic treatment as needed. In addition, I have reinforced the need for patient directed risk factor modification. Tobacco use was discussed with the patient and educated on the negative effects. I have asked the patient to not utilize these agents. Thank you for allowing to us to participate in the [facility] or [facility]ylic[facility]. Further evaluation will be based upon the patient's clinical course and testing results. All questions and concerns were addressed to the patient/family. Alternatives to my treatment were discussed. The note was completed using EMR. Every effort was made to ensure accuracy; however, inadvertent computerized transcription errors may be present.       Josh Ramirez MD, MD 9/27/2022 8:43 AM

## 2022-09-27 NOTE — PROGRESS NOTES
Pt alert and oriented x4. Pain in head 8 on a scale of 0-10. See MAR for pain intervention medication. Pt reassessed 45 min after and still 8. Pt took all medications without issue and ate 100% of lunch. Pt oral fluid intake is adequate. Pt is in good spirits cooperative with care. Pt is in bed with call light and table tray in reach. All pts needs met at this time. Will continue to monitor.

## 2022-09-28 VITALS
WEIGHT: 217.8 LBS | BODY MASS INDEX: 35 KG/M2 | OXYGEN SATURATION: 98 % | RESPIRATION RATE: 16 BRPM | TEMPERATURE: 98.5 F | SYSTOLIC BLOOD PRESSURE: 118 MMHG | HEART RATE: 60 BPM | DIASTOLIC BLOOD PRESSURE: 68 MMHG | HEIGHT: 66 IN

## 2022-09-28 LAB
ANION GAP SERPL CALCULATED.3IONS-SCNC: 7 MMOL/L (ref 3–16)
BASOPHILS ABSOLUTE: 0 K/UL (ref 0–0.2)
BASOPHILS RELATIVE PERCENT: 0.7 %
BUN BLDV-MCNC: 10 MG/DL (ref 7–20)
CALCIUM SERPL-MCNC: 9.1 MG/DL (ref 8.3–10.6)
CHLORIDE BLD-SCNC: 108 MMOL/L (ref 99–110)
CO2: 21 MMOL/L (ref 21–32)
CREAT SERPL-MCNC: 0.6 MG/DL (ref 0.6–1.1)
EOSINOPHILS ABSOLUTE: 0.2 K/UL (ref 0–0.6)
EOSINOPHILS RELATIVE PERCENT: 3.7 %
GFR AFRICAN AMERICAN: >60
GFR NON-AFRICAN AMERICAN: >60
GLUCOSE BLD-MCNC: 100 MG/DL (ref 70–99)
HCT VFR BLD CALC: 33.9 % (ref 36–48)
HEMOGLOBIN: 11.3 G/DL (ref 12–16)
LYMPHOCYTES ABSOLUTE: 2.1 K/UL (ref 1–5.1)
LYMPHOCYTES RELATIVE PERCENT: 31.3 %
MCH RBC QN AUTO: 29.2 PG (ref 26–34)
MCHC RBC AUTO-ENTMCNC: 33.3 G/DL (ref 31–36)
MCV RBC AUTO: 87.6 FL (ref 80–100)
MONOCYTES ABSOLUTE: 0.9 K/UL (ref 0–1.3)
MONOCYTES RELATIVE PERCENT: 13.7 %
NEUTROPHILS ABSOLUTE: 3.3 K/UL (ref 1.7–7.7)
NEUTROPHILS RELATIVE PERCENT: 50.6 %
PDW BLD-RTO: 13.1 % (ref 12.4–15.4)
PLATELET # BLD: 198 K/UL (ref 135–450)
PMV BLD AUTO: 9.1 FL (ref 5–10.5)
POTASSIUM REFLEX MAGNESIUM: 3.9 MMOL/L (ref 3.5–5.1)
RBC # BLD: 3.87 M/UL (ref 4–5.2)
SODIUM BLD-SCNC: 136 MMOL/L (ref 136–145)
TROPONIN: <0.01 NG/ML
WBC # BLD: 6.6 K/UL (ref 4–11)

## 2022-09-28 PROCEDURE — 80048 BASIC METABOLIC PNL TOTAL CA: CPT

## 2022-09-28 PROCEDURE — 2580000003 HC RX 258: Performed by: INTERNAL MEDICINE

## 2022-09-28 PROCEDURE — 84484 ASSAY OF TROPONIN QUANT: CPT

## 2022-09-28 PROCEDURE — 6360000002 HC RX W HCPCS: Performed by: INTERNAL MEDICINE

## 2022-09-28 PROCEDURE — 85025 COMPLETE CBC W/AUTO DIFF WBC: CPT

## 2022-09-28 PROCEDURE — 36415 COLL VENOUS BLD VENIPUNCTURE: CPT

## 2022-09-28 PROCEDURE — 6370000000 HC RX 637 (ALT 250 FOR IP): Performed by: INTERNAL MEDICINE

## 2022-09-28 RX ORDER — BUTALBITAL, ACETAMINOPHEN AND CAFFEINE 50; 325; 40 MG/1; MG/1; MG/1
1 TABLET ORAL EVERY 4 HOURS PRN
Qty: 40 TABLET | Refills: 0 | Status: SHIPPED | OUTPATIENT
Start: 2022-09-28

## 2022-09-28 RX ORDER — KETOROLAC TROMETHAMINE 30 MG/ML
30 INJECTION, SOLUTION INTRAMUSCULAR; INTRAVENOUS ONCE
Status: COMPLETED | OUTPATIENT
Start: 2022-09-28 | End: 2022-09-28

## 2022-09-28 RX ORDER — AMOXICILLIN AND CLAVULANATE POTASSIUM 875; 125 MG/1; MG/1
1 TABLET, FILM COATED ORAL EVERY 12 HOURS SCHEDULED
Qty: 12 TABLET | Refills: 0 | Status: SHIPPED | OUTPATIENT
Start: 2022-09-28 | End: 2022-10-04

## 2022-09-28 RX ORDER — ALBUTEROL SULFATE 2.5 MG/3ML
0.63 SOLUTION RESPIRATORY (INHALATION) EVERY 6 HOURS PRN
Qty: 120 EACH | Refills: 3
Start: 2022-09-28

## 2022-09-28 RX ADMIN — AMOXICILLIN AND CLAVULANATE POTASSIUM 1 TABLET: 875; 125 TABLET, FILM COATED ORAL at 09:05

## 2022-09-28 RX ADMIN — SODIUM CHLORIDE, PRESERVATIVE FREE 10 ML: 5 INJECTION INTRAVENOUS at 09:05

## 2022-09-28 RX ADMIN — ACETAMINOPHEN 650 MG: 325 TABLET ORAL at 04:05

## 2022-09-28 RX ADMIN — SODIUM CHLORIDE: 9 INJECTION, SOLUTION INTRAVENOUS at 07:55

## 2022-09-28 RX ADMIN — KETOROLAC TROMETHAMINE 30 MG: 30 INJECTION, SOLUTION INTRAMUSCULAR at 09:05

## 2022-09-28 ASSESSMENT — PAIN SCALES - GENERAL
PAINLEVEL_OUTOF10: 8
PAINLEVEL_OUTOF10: 9
PAINLEVEL_OUTOF10: 7

## 2022-09-28 ASSESSMENT — PAIN DESCRIPTION - PAIN TYPE
TYPE: ACUTE PAIN
TYPE: ACUTE PAIN

## 2022-09-28 ASSESSMENT — PAIN DESCRIPTION - LOCATION
LOCATION: HEAD
LOCATION: HEAD

## 2022-09-28 NOTE — PROGRESS NOTES
Data- discharge order received, pt verbalized agreement to discharge, disposition to previous residence, no needs for HHC/DME. Action- discharge instructions prepared and given to patient, pt verbalized understanding. Medication information packet given r/t NEW and/or CHANGED prescriptions emphasizing name/purpose/side effects, pt verbalized understanding. Discharge instruction summary: Diet- Regular, Activity- Resume as tolerated, Primary Care Physician as followsShawn Luong -[phone] f/u appointment 1-2 weeks, immunizations reviewed and up-to-date, prescription medications filled by outpatient pharmacy and delivered to bedside. Inpatient surgical procedure precautions reviewed: N/A       1. WEIGHT: Admit Weight: 203 lb (92.1 kg) (09/26/22 1031)        Today  Weight: 217 lb 12.8 oz (98.8 kg) (09/28/22 0903)       2. O2 SAT.: SpO2: 98 % (09/28/22 0850)    Response- Pt belongings gathered, IV removed. Disposition is home (no HHC/DME needs), transported with belongings, taken to lobby via w/c w/ significant other, no complications.

## 2022-09-28 NOTE — PROGRESS NOTES
CLINICAL PHARMACY NOTE: MEDS TO BEDS    Total # of Prescriptions Filled: 2   The following medications were delivered to the patient:  Augmentin 875-125 mg  Butalbital-apap-caff -40 mg    Additional Documentation:  Delivered to Patient=Signed  Ok to be delivered per [facility] Sherley Martin

## 2022-09-28 NOTE — PROGRESS NOTES
Hospitalist Progress Note      PCP: Yesy Armando DO    Date of Admission: 9/26/2022    Chief Complaint: Nausea, vomiting, urine incontinence, syncope, HA and neck pain    Hospital Course:  29 y.o. 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 She has been noticing lymphadenopathy in L supraclavilar area, neck and occipital areas for past 4-5 months. Saw Dr Krishan Vides (ENT) who ordered for outpatient CT Neck with contrast.  Patient [place] today and 40 minutes later could not drive as she was dizzy and lightheaded. No fecal incontinence. No CP, SOB, palpitations or aura. Says she had diarrhea for about 7 days. Talia Badillo is ICU RN at [facility]. Patient denies melena and hematochezia. Had LP in ED. VQ low probability for PE in ED. Admitted as inpatient for further evaluation. Seen by ENT, recommend to start antibiotics (Augmentin started) and f/u with ENT in office. Seen by Neuro. EEG negative. MRI Brain and C spine pending. Seen by Cardio who recommend no further work up as Echo negative. Echo:  Normal left ventricle size, wall thickness, and systolic function with an   estimated ejection fraction of 60%. No regional wall motion abnormalities are seen. Normal diastolic function. Avg. E/e'=7.1   A bubble study was performed and fails to show evidence of shunting. Seen by GI:  Stool studies and C diff requested. Offered EGD and colonoscopy as inpatient or outpatient. Seen by Oncology. CT C/A/P w/o contrast requested. Subjective: Patient with HA. No CP, SOB, abdominal pain or diarrhea. No fevers.        Medications:  Reviewed    Infusion Medications    sodium chloride      sodium chloride 75 mL/hr at 09/27/22 1139     Scheduled Medications    amoxicillin-clavulanate  1 tablet Oral 2 times per day    sodium chloride flush  5-40 mL IntraVENous 2 times per day    enoxaparin  40 mg SubCUTAneous Daily     PRN Meds: gadobenate dimeglumine, albuterol, sodium chloride flush, sodium chloride, ondansetron **OR** ondansetron, polyethylene glycol, acetaminophen **OR** acetaminophen, perflutren lipid microspheres      Intake/Output Summary (Last 24 hours) at 9/27/2022 2056  Last data filed at 9/27/2022 0640  Gross per 24 hour   Intake 754.27 ml   Output 900 ml   Net -145.73 ml       Physical Exam Performed:    /67   Pulse 71   Temp 98.8 °F (37.1 °C) (Oral)   Resp 16   Ht 5' 6\" (1.676 m)   Wt 203 lb (92.1 kg)   LMP 09/19/2022   SpO2 96%   BMI 32.77 kg/m²     General appearance:  Appears comfortable. Well nourished  Eyes: Sclera clear, pupils equal  ENT: Moist mucus membranes, no thrush. Trachea midline. Cardiovascular: Regular rhythm, normal S1, S2. No murmur, gallop, rub. No edema in lower extremities  Respiratory: Clear to auscultation bilaterally, no wheeze, good inspiratory effort  Gastrointestinal: Abdomen soft, no LLQ tenderness, not distended, normal bowel sounds  Musculoskeletal: L supraclavicular and BL cervical LAD appreciated  Neurology: Cranial nerves grossly intact. Alert and oriented in time, place and person. No speech or motor deficits  Psychiatry: Appropriate affect.  Not agitated  Skin: Warm, dry, normal turgor, no rash  Brisk capillary refill, peripheral pulses palpable     Labs:   Recent Labs     09/26/22  1127 09/27/22  0734   WBC 5.2 5.6   HGB 13.0 11.1*   HCT 39.5 33.5*    211     Recent Labs     09/26/22  1127 09/27/22  0734    139   K 3.9 4.3    109   CO2 23 25   BUN 14 9   CREATININE 0.7 0.7   CALCIUM 9.3 8.8     Recent Labs     09/26/22  1127   AST 19   ALT 17   BILITOT 0.5   ALKPHOS 108     Recent Labs     09/26/22  1127   INR 1.02     Recent Labs     09/26/22  1903 09/27/22  0115 09/27/22  0734   TROPONINI <0.01 <0.01 <0.01       Urinalysis:      Lab Results   Component Value Date/Time    NITRU Negative 09/26/2022 11:27 AM    BLOODU Negative 09/26/2022 11:27 AM    SPECGRAV >=1.030 09/26/2022 11:27 AM    GLUCOSEU Negative 09/26/2022 11:27 AM       Radiology:  NM LUNG VENT/PERFUSION (VQ)   Final Result   Low probability for pulmonary embolus. CT HEAD WO CONTRAST   Final Result   No acute intracranial abnormality. XR CHEST PORTABLE   Final Result   No radiographic evidence of acute pulmonary disease. MRI BRAIN W WO CONTRAST    (Results Pending)   MRI CERVICAL SPINE W WO CONTRAST    (Results Pending)   CT CHEST ABDOMEN PELVIS WO CONTRAST    (Results Pending)           Assessment/Plan:    Active Hospital Problems    Diagnosis     Atypical migraine [G43.009]      Priority: Medium    Syncope and collapse [R55]      Priority: Medium    Urine incontinence [R32]      Priority: Medium    Dehydration [E86.0]      Priority: Medium    Headache [R51.9]      Priority: Medium    Neck pain [M54.2]      Priority: Medium    Cervical adenopathy [R59.0]      Priority: Medium    Diarrhea [R19.7]      Priority: Medium    Obesity (BMI 30-39. 9) [E66.9]      Priority: Medium     NPO p MN for possible EGD and colonoscopy tomorrow  F/U MRI Brain  F/U MRI C spine  F/U CT C/A/P  Started on PO Augmentin per ENT recommendations  EEG negative  Stool studies if further diarrhea here  Continue IVF  Toradol IV PRN HA  ENT consult appreciated  Cardio consult appreciated  Neuro consult appreciated  GI consult appreciated  Oncology consult appreciated    DVT Prophylaxis: Lovenox  Diet: ADULT DIET; Regular  Code Status: Full Code  PT/OT Eval Status: Not needed    Dispo - Home    Discussed with patient, nursing and CM. EGD and colonoscopy tomorrow if imaging negative and no further diarrhea.       Fatou Angeles MD

## 2022-09-28 NOTE — PLAN OF CARE
Problem: Discharge Planning  Goal: Discharge to home or other facility with appropriate resources  Outcome: Progressing  Flowsheets (Taken 9/27/2022 2119)  Discharge to home or other facility with appropriate resources: Identify barriers to discharge with patient and caregiver     Problem: Pain  Goal: Verbalizes/displays adequate comfort level or baseline comfort level  Outcome: Adequate for Discharge  Flowsheets (Taken 9/27/2022 2350)  Verbalizes/displays adequate comfort level or baseline comfort level:   Encourage patient to monitor pain and request assistance   Assess pain using appropriate pain scale   Administer analgesics based on type and severity of pain and evaluate response  Note: Pt has tylenol for pain management. Has not needed any this shift.       Problem: Safety - Adult  Goal: Free from fall injury  Outcome: Adequate for Discharge  Flowsheets (Taken 9/28/2022 0408)  Free From Fall Injury: Instruct family/caregiver on patient safety     Problem: Hematologic - Adult  Goal: Maintains hematologic stability  Outcome: Adequate for Discharge  Flowsheets  Taken 9/28/2022 0408  Maintains hematologic stability:   Monitor labs for bleeding or clotting disorders   Assess for signs and symptoms of bleeding or hemorrhage  Taken 9/27/2022 2119  Maintains hematologic stability: Assess for signs and symptoms of bleeding or hemorrhage     Problem: Safety - Adult  Goal: Free from fall injury  Outcome: Adequate for Discharge  Flowsheets (Taken 9/28/2022 0408)  Free From Fall Injury: Instruct family/caregiver on patient safety     Problem: Hematologic - Adult  Goal: Maintains hematologic stability  Outcome: Adequate for Discharge  Flowsheets  Taken 9/28/2022 0408  Maintains hematologic stability:   Monitor labs for bleeding or clotting disorders   Assess for signs and symptoms of bleeding or hemorrhage  Taken 9/27/2022 2119  Maintains hematologic stability: Assess for signs and symptoms of bleeding or hemorrhage

## 2022-09-28 NOTE — PLAN OF CARE
Problem: Pain  Goal: Verbalizes/displays adequate comfort level or baseline comfort level  9/28/2022 0913 by Marichuy Flores RN  Outcome: Progressing  Note: Patient with c/o headache 9/10. One time dose of IV Toradol administered per orders - see MAR. Will continue to monitor. Problem: Safety - Adult  Goal: Free from fall injury  9/28/2022 0913 by Marichuy Flores RN  Outcome: Progressing  Note: Patient remains absent from falls at this time. Remains alert and oriented, in bed with call light and belongings in reach. Non-slip footwear on and 2/4 siderails raised. Bed remains in lowest/locked position at all times with alarm activated. Fall precautions in place. Patient encouraged to use call light to request assistance, v/u.  Will continue to monitor. Problem: Hematologic - Adult  Goal: Maintains hematologic stability  9/28/2022 0913 by Marichuy Flores RN  Outcome: Progressing  Note: Patient VSS at this time on room air. Will continue to monitor.

## 2022-09-28 NOTE — DISCHARGE INSTRUCTIONS
Your information:  Name: Daina Haq  : 1990    Your Discharge Instructions    What to do after you leave the hospital:    Read, review and familiarize yourself with the information provided below and in a separate packet on  Fainting and Migraine Headache    Diet: Regular    Recommended activity:  Resume as tolerated; Driving precautions and restrictions per Neurology  Avoid strenuous activity until instructed by your physician to resume; balance rest with periods of light to normal activity. If you experience any of the following: Unusual or inadequately controlled pain; unusual transient shortness of breath; recurrent or persistent nausea, heartburn, palpitations or lightheadedness; increased swelling; increased fatigue; fever >100; please follow up with Caio Pressley DO or go to the Emergency Room.       Home Health/ Outpatient Services: N/A      Information obtained by:  By signing below, I understand and acknowledge receipt of the instructions indicated above, and I understand that if any problems occur once I leave the hospital I am to contact Caio Pressley DO.

## 2022-09-28 NOTE — DISCHARGE SUMMARY
Hospital Medicine Discharge Summary    Patient: Jackie Brown     Gender: female  : 1990   Age: 29 y.o. MRN: [id number]    Admitting Physician: Maribel Ribeiro MD  Discharge Physician: Maribel Ribeiro MD     Code Status: Full Code     Admit Date: 2022   Discharge Date:   22    Disposition:  Home    Discharge Diagnoses: Active Hospital Problems    Diagnosis Date Noted    Atypical migraine [G43.009] 2022     Priority: Medium    Syncope and collapse [R55] 2022     Priority: Medium    Urine incontinence [R32] 2022     Priority: Medium    Dehydration [E86.0] 2022     Priority: Medium    Headache [R51.9] 2022     Priority: Medium    Neck pain [M54.2] 2022     Priority: Medium    Cervical adenopathy [R59.0] 2022     Priority: Medium    Diarrhea [R19.7] 2022     Priority: Medium    Obesity (BMI 30-39. 9) [E66.9] 2022     Priority: Medium       Follow-up appointments:  one week    Outpatient to do list: F/U with PCP, ENT, GI, Oncology, Neurology and Cardio    Condition at Discharge:  [address] Course:   29 y.o. 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 She has been noticing lymphadenopathy in L supraclavilar area, neck and occipital areas for past 4-5 months. Saw Dr Caro Walls (ENT) who ordered for outpatient CT Neck with contrast.  Patient had [place] in [place] today and 40 minutes later could not drive as she was dizzy and lightheaded. No fecal incontinence. No CP, SOB, palpitations or aura. Says she had diarrhea for about 7 days. Urszula Alicea is ICU RN at [facility]. Patient denies melena and hematochezia. Had LP in ED. VQ low probability for PE in ED. Admitted as inpatient for further evaluation. Seen by ENT, recommend to start antibiotics (Augmentin started) and f/u with ENT in office. Seen by Neuro. EEG negative. MRI Brain:  There is no acute infarction, intracranial hemorrhage, or intracranial mass   lesion. MRI C spine . Straightening of cervical lordosis       At C5-C6, grade 1 anterolisthesis and mild left neural foraminal narrowing       At C6-C7, grade 1 anterolisthesis and central disc protrusion     Seen by Cardio who recommend no further work up as Echo negative. Echo:  Normal left ventricle size, wall thickness, and systolic function with an   estimated ejection fraction of 60%. No regional wall motion abnormalities are seen. Normal diastolic function. Avg. E/e'=7.1   A bubble study was performed and fails to show evidence of shunting. Seen by GI:  Stool studies and C diff requested, but no further diarrhea. Offered EGD and colonoscopy as outpatient. Seen by Oncology. CT C/A/P w/o contrast:  1. Few subcentimeter mediastinal lymph nodes are noted, nonspecific. No   enlarged or suspicious-appearing lymph nodes otherwise appreciated in the   abdomen or pelvis. 2.  Cholelithiasis. Will finish course of PO Augmentin for cervical adenopathy as outpatient. Needs outpatient EGD and colonoscopy with GI.    F/U with PCP, ENT, GI, Oncology, Neuro and Cardio. Ok to return to work today if desired.     Discharge Medications:   Current Discharge Medication List        START taking these medications    Details   amoxicillin-clavulanate (AUGMENTIN) 875-125 MG per tablet Take 1 tablet by mouth every 12 hours for 6 days  Qty: 12 tablet, Refills: 0      butalbital-acetaminophen-caffeine (FIORICET, ESGIC) -40 MG per tablet Take 1 tablet by mouth every 4 hours as needed for Headaches  Qty: 40 tablet, Refills: 0           Current Discharge Medication List        CONTINUE these medications which have CHANGED    Details   albuterol (PROVENTIL) (2.5 MG/3ML) 0.083% nebulizer solution Take 0.76 mLs by nebulization every 6 hours as needed for Shortness of Breath  Qty: 120 each, Refills: 3           Current Discharge Medication List        CONTINUE these medications which have NOT CHANGED    Details   Norelgestromin-Eth Estradiol Ameliekaren Severino TD) Place onto the skin           Current Discharge Medication List        STOP taking these medications       albuterol (ACCUNEB) 0.63 MG/3ML nebulizer solution Comments:   Reason for Stopping:             Discharge Exam:    /68   Pulse 60   Temp 98.5 °F (36.9 °C) (Oral)   Resp 16   Ht 5' 6\" (1.676 m)   Wt 217 lb 12.8 oz (98.8 kg)   LMP 09/19/2022   SpO2 98%   BMI 35.15 kg/m²   General appearance:  Appears comfortable. Well nourished  Eyes: Sclera clear, pupils equal  ENT: Moist mucus membranes, no thrush. Trachea midline. Cardiovascular: Regular rhythm, normal S1, S2. No murmur, gallop, rub. No edema in lower extremities  Respiratory: Clear to auscultation bilaterally, no wheeze, good inspiratory effort  Gastrointestinal: Abdomen soft, no LLQ tenderness, not distended, normal bowel sounds  Musculoskeletal: L supraclavicular and BL cervical LAD appreciated  Neurology: Cranial nerves grossly intact. Alert and oriented in time, place and person. No speech or motor deficits  Psychiatry: Appropriate affect. Not agitated  Skin: Warm, dry, normal turgor, no rash  Brisk capillary refill, peripheral pulses palpable        Labs:  For convenience and continuity at follow-up the following most recent labs are provided:    Lab Results   Component Value Date/Time    WBC 6.6 09/28/2022 06:23 AM    HGB 11.3 09/28/2022 06:23 AM    HCT 33.9 09/28/2022 06:23 AM    MCV 87.6 09/28/2022 06:23 AM     09/28/2022 06:23 AM     09/28/2022 06:23 AM    K 3.9 09/28/2022 06:23 AM     09/28/2022 06:23 AM    CO2 21 09/28/2022 06:23 AM    BUN 10 09/28/2022 06:23 AM    CREATININE 0.6 09/28/2022 06:23 AM    CALCIUM 9.1 09/28/2022 06:23 AM    ALKPHOS 108 09/26/2022 11:27 AM    ALT 17 09/26/2022 11:27 AM    AST 19 09/26/2022 11:27 AM BILITOT 0.5 09/26/2022 11:27 AM    LABALBU 4.0 09/26/2022 11:27 AM     Lab Results   Component Value Date    INR 1.02 09/26/2022       Radiology:  Echo Complete    Result Date: 9/27/2022  Transthoracic Echocardiography Report (TTE)  Demographics   Patient Name       Scheryl Nissen   Date of Study      09/27/2022        Gender              Female   Patient Number     [id number]        Date of Birth       1990   Visit Number       [id number]         Age                 29 year(s)   Accession Number   [id number]        Room Number         1534   Corporate ID       [id number]          [facility]   Ordering Physician Comfort Gomez MD  Interpreting        Cyndi Wang MD,                                       Physician           [facility]  Procedure Type of Study   TTE procedure:ECHOCARDIOGRAM COMPLETE 2D W DOPPLER W COLOR. Procedure Date Date: 09/27/2022 Start: 08:37 AM Study Location: [facility] - Echo Lab Technical Quality: Adequate visualization Indications:Syncope. Patient Status: Routine Contrast Medium: Bubble Study. Height: 66 inches Weight: 203 pounds BSA: 2.01 m2 BMI: 32.77 kg/m2 BP: 108/70 mmHg  Conclusions   Summary  Normal left ventricle size, wall thickness, and systolic function with an  estimated ejection fraction of 60%. No regional wall motion abnormalities are seen. Normal diastolic function. Avg. E/e'=7.1  A bubble study was performed and fails to show evidence of shunting. Signature   ------------------------------------------------------------------  Electronically signed by Cyndi Wang MD, [facility] (Interpreting  physician) on 09/27/2022 at 10:58 AM  ------------------------------------------------------------------   Findings   Left Ventricle  Normal left ventricle size, wall thickness, and systolic function with an  estimated ejection fraction of 60%.  No regional wall motion abnormalities are seen. Normal diastolic function. Avg. E/e'=7.1   Mitral Valve  The mitral valve normal in structure and function. No evidence of mitral regurgitation or stenosis. Left Atrium  The left atrium is normal in size. A bubble study was performed and fails to show evidence of shunting. Aortic Valve  The aortic valve is structurally normal. There is no significant aortic  valve regurgitation or stenosis. Aorta  The aortic root is normal in size. Right Ventricle  The right ventricle is normal in size and function. TAPSE is estimated at 2.20 cm. RV 's' velocity=14.8 cm/s   Tricuspid Valve  Tricuspid valve is structurally normal.  There is trivial tricuspid regurgitation with a RVSP estimation of 17 mmHg. Right Atrium  The right atrial size is normal.   Pulmonic Valve  The pulmonic valve is structurally normal.  No evidence of pulmonic valve stenosis. No evidence of pulmonic valve regurgitation. Pericardial Effusion  No pericardial effusion noted. Pleural Effusion  No pleural effusion. Miscellaneous  The inferior vena cava appears normal in size with normal respiratory  variation.   M-Mode/2D Measurements (cm)   LV Diastolic Dimension: 9.16 cm LV Systolic Dimension: 9.36 cm  LV Septum Diastolic: 3.65 cm  LV PW Diastolic: 5.52 cm        AO Root Dimension: 2.6 cm                                  LA Dimension: 3.1 cm                                  LA Area: 16.4 cm2  LVOT: 1.9 cm                    LA volume/Index: 46.2 ml /23 ml/m2  Doppler Measurements   AV Peak Velocity: 131 cm/s     MV Peak E-Wave: 106 cm/s  AV Peak Gradient: 6.86 mmHg    MV Peak A-Wave: 86.9 cm/s  AV Mean Gradient: 4 mmHg       MV E/A Ratio: 1.22  LVOT Peak Velocity: 101 cm/s  AV Area (Continuity):2.2 cm2   TR Velocity:187 cm/s  TR Gradient:13.99 mmHg  Estimated RAP:3 mmHg  Estimated RVSP: 17 mmHg  E' Septal Velocity: 12.2 cm/s  E' Lateral Velocity: 19.4 cm/s   Aortic Valve   Peak Velocity: 131 cm/s    Mean Velocity: 92.5 cm/s Peak Gradient: 6.86 mmHg   Mean Gradient: 4 mmHg  Area (continuity): 2.2 cm2  AV VTI: 29.1 cm  Aorta   Aortic Root: 2.6 cm  Ascending Aorta: 2.6 cm  LVOT Diameter: 1.9 cm      CT HEAD WO CONTRAST    Result Date: 9/26/2022  EXAMINATION: CT OF THE HEAD WITHOUT CONTRAST  9/26/2022 11:41 am TECHNIQUE: CT of the head was performed without the administration of intravenous contrast. Automated exposure control, iterative reconstruction, and/or weight based adjustment of the mA/kV was utilized to reduce the radiation dose to as low as reasonably achievable. COMPARISON: None. HISTORY: ORDERING SYSTEM PROVIDED HISTORY: Syncope TECHNOLOGIST PROVIDED HISTORY: If patient is on cardiac monitor and/or pulse ox, they may be taken off cardiac monitor and pulse ox, left on O2 if currently on. All monitors reattached when patient returns to room. Has a \"code stroke\" or \"stroke alert\" been called? ->No Reason for exam:->Syncope Is the patient pregnant?->No Reason for Exam: Syncope FINDINGS: BRAIN/VENTRICLES: There is no acute intracranial hemorrhage, mass effect or midline shift. No abnormal extra-axial fluid collection. The gray-white differentiation is maintained without evidence of an acute infarct. There is no evidence of hydrocephalus. ORBITS: The visualized portion of the orbits demonstrate no acute abnormality. SINUSES: The visualized paranasal sinuses and mastoid air cells demonstrate no acute abnormality. SOFT TISSUES/SKULL:  No acute abnormality of the visualized skull or soft tissues. No acute intracranial abnormality. CT SOFT TISSUE NECK W CONTRAST    Result Date: 9/26/2022  EXAMINATION: CT OF THE NECK SOFT TISSUE WITH CONTRAST  9/26/2022 TECHNIQUE: CT of the neck was performed with the administration of intravenous contrast. Multiplanar reformatted images are provided for review.  Automated exposure control, iterative reconstruction, and/or weight based adjustment of the mA/kV was utilized to reduce the radiation dose to as low as reasonably achievable. COMPARISON: None. HISTORY: ORDERING SYSTEM PROVIDED HISTORY: Posterior cervical adenopathy TECHNOLOGIST PROVIDED HISTORY: STAT Creatinine as needed:->No Reason for Exam: swollen lymphnodes Initial evaluation. FINDINGS: PHARYNX/LARYNX:  The visualized upper aerodigestive tract appears symmetric. No discrete mass identified. The epiglottis appears normal. SALIVARY GLANDS/THYROID:  The parotid, submandibular and thyroid glands demonstrate no acute abnormality. LYMPH NODES:  Borderline prominent posterior triangle lymph nodes are seen bilaterally, right more than left. These measure up to 9 mm on the right and 6 mm on the left. There is a borderline prominent left supraclavicular lymph node measuring 7 mm. SOFT TISSUES:  No appreciable soft tissue swelling is seen. BRAIN/ORBITS/SINUSES:  The visualized portion of the intracranial contents appear unremarkable. The visualized portion of the orbits, paranasal sinuses and mastoid air cells demonstrate no acute abnormality. LUNG APICES/SUPERIOR MEDIASTINUM:  No focal consolidation within the visualized lung apices. No acute abnormality within the visualized superior mediastinum. BONES:  No aggressive appearing lytic or blastic bony lesion. 1. Nonspecific borderline prominent posterior triangle lymph nodes bilaterally, right more than left as well as a borderline prominent left supraclavicular lymph node. 2. Otherwise, no acute abnormality seen of the soft tissue structures of the neck.      MRI CERVICAL SPINE W WO CONTRAST    Result Date: 9/28/2022  EXAMINATION: MRI OF THE CERVICAL SPINE WITHOUT AND WITH CONTRAST; MRI OF THE BRAIN WITHOUT AND WITH CONTRAST  9/27/2022 7:55 pm: TECHNIQUE: Multiplanar multisequence MRI of the cervical spine was performed without and with the administration of intravenous contrast.; Multiplanar multisequence MRI of the head/brain was performed without and with the administration of intravenous contrast. COMPARISON: None. HISTORY: ORDERING SYSTEM PROVIDED HISTORY: Syncope, neck pain TECHNOLOGIST PROVIDED HISTORY: Reason for exam:->Syncope, neck pain Decision Support Exception - unselect if not a suspected or confirmed emergency medical condition->Emergency Medical Condition (MA) Reason for Exam: Migraine, syncope, swollen lymph nodes in neck. Multihance 18ml. FINDINGS: Cervical MRI: BONES/ALIGNMENT: There is straightening of cervical lordosis. The vertebral body heights are maintained. The bone marrow signal appears unremarkable. SPINAL CORD: No abnormal cord signal is seen. SOFT TISSUES: No abnormal enhancement of the cervical spine. No paraspinal mass identified. C2-C3: There is no significant disc protrusion, spinal canal stenosis or neural foraminal narrowing. C3-C4: There is no significant disc protrusion, spinal canal stenosis or neural foraminal narrowing. C4-C5: There is no significant disc protrusion, spinal canal stenosis or neural foraminal narrowing. Subtle disc bulging. C5-C6: Mild loss of disc signal.  Posterior uncovertebral hypertrophy with disc bulging. Mild left neural foraminal narrowing. C6-C7: Grade 1 anterolisthesis with disc bulging and 2 mm central disc protrusion. Otherwise unremarkable C7-T1: There is no significant disc protrusion, spinal canal stenosis or neural foraminal narrowing. MRI brain: There is no acute infarction, intracranial hemorrhage, or intracranial mass lesion. No mass effect, midline shift, or extra-axial collection is noted. The brain parenchyma is  normal. The pituitary gland is normal in appearance. The cerebellar tonsils are in normal position. The ventricles, sulci, and cisterns are within normal size and range. No significant volume loss. .  The intracranial flow voids are preserved. The globes and orbits are within normal limits. The visualized extracranial structures including paranasal sinuses and mastoid air cells are unremarkable.      There is no acute infarction, intracranial hemorrhage, or intracranial mass lesion. IMPRESSION: Cervical MRI: Straightening of cervical lordosis At C5-C6, grade 1 anterolisthesis and mild left neural foraminal narrowing At C6-C7, grade 1 anterolisthesis and central disc protrusion Brain MRI: Unremarkable contrast enhanced brain MRI. No acute infarction, intracranial hemorrhage or mass lesion. No abnormal enhancement. NM LUNG VENT/PERFUSION (VQ)    Result Date: 9/26/2022  EXAMINATION: NUCLEAR MEDICINE VENTILATION PERFUSION SCAN. 9/26/2022 TECHNIQUE: 14 millicuries of LCLAC-871 was administered via mask prior to planar imaging of the lungs in anterior and posterior projections. Then, 3.19 millicuries of Tc 52U MAA was administered intravenously prior to planar imaging of the lungs in multiple projections. COMPARISON: Chest radiograph . HISTORY: ORDERING SYSTEM PROVIDED HISTORY: tachycardia, syncope, history of PE TECHNOLOGIST PROVIDED HISTORY: Reason for exam:->tachycardia, syncope, history of PE Decision Support Exception - unselect if not a suspected or confirmed emergency medical condition->Emergency Medical Condition (MA) Is the patient pregnant?->No Reason for Exam: Tachycardia; History of PE Additional signs and symptoms: Allergy to CT contrast, History of passing out FINDINGS: VENTILATION: Ventilation images are unremarkable. PERFUSION: Distribution of radiotracer is homogeneous. No segmental defects identified. CHEST RADIOGRAPH: No focal areas of consolidation or significant effusions on recent chest radiograph. Low probability for pulmonary embolus. XR CHEST PORTABLE    Result Date: 9/26/2022  EXAMINATION: ONE XRAY VIEW OF THE CHEST 9/26/2022 11:33 am COMPARISON: Chest x-ray dated 02/04/2019.  HISTORY: ORDERING SYSTEM PROVIDED HISTORY: Syncope TECHNOLOGIST PROVIDED HISTORY: Reason for exam:->Syncope Reason for Exam: syncope FINDINGS: HEART/MEDIASTINUM: The cardiomediastinal silhouette is within normal limits. PLEURA/LUNGS: There are no focal consolidations or pleural effusions. There is no appreciable pneumothorax. BONES/SOFT TISSUE: No acute abnormality. No radiographic evidence of acute pulmonary disease. CT CHEST ABDOMEN PELVIS WO CONTRAST    Result Date: 9/27/2022  EXAMINATION: CT OF THE CHEST, ABDOMEN, AND PELVIS WITHOUT CONTRAST 9/27/2022 7:09 pm TECHNIQUE: CT of the chest, abdomen and pelvis was performed without the administration of intravenous contrast. Multiplanar reformatted images are provided for review. Automated exposure control, iterative reconstruction, and/or weight based adjustment of the mA/kV was utilized to reduce the radiation dose to as low as reasonably achievable. COMPARISON: None HISTORY: ORDERING SYSTEM PROVIDED HISTORY: Lymphoma TECHNOLOGIST PROVIDED HISTORY: Reason for exam:->Lymphoma Additional Contrast?->None Is the patient pregnant?->No Reason for Exam: Lymphoma FINDINGS: Chest: Mediastinum: The heart and great vessels are normal in size. No pericardial effusion. Few small mediastinal lymph nodes are noted measuring up to 0.7 cm at level 2R. Lungs/pleura: No acute airspace disease or effusion. The central airway is patent. Soft Tissues/Bones: No acute abnormality identified. No axillary lymphadenopathy identified. Abdomen/Pelvis: Organs: Evaluation of the abdominal and pelvic viscera is limited in the absence of contrast.  The liver, gallbladder, pancreas, spleen, kidneys, and adrenals reveal no acute findings. Cholelithiasis. Normal size spleen. GI/Bowel: There is no bowel dilatation or wall thickening identified. Pelvis: Trace pelvic free fluid, likely physiologic Peritoneum/Retroperitoneum: No free air or free fluid. The aorta is normal in caliber. The visceral branches are patent. No lymphadenopathy. Bones/Soft Tissues: No acute findings. *Unless otherwise specified, incidental findings do not require dedicated imaging follow-up.      1.  Few subcentimeter mediastinal lymph nodes are noted, nonspecific. No enlarged or suspicious-appearing lymph nodes otherwise appreciated in the abdomen or pelvis. 2.  Cholelithiasis. MRI BRAIN W WO CONTRAST    Result Date: 9/28/2022  EXAMINATION: MRI OF THE CERVICAL SPINE WITHOUT AND WITH CONTRAST; MRI OF THE BRAIN WITHOUT AND WITH CONTRAST  9/27/2022 7:55 pm: TECHNIQUE: Multiplanar multisequence MRI of the cervical spine was performed without and with the administration of intravenous contrast.; Multiplanar multisequence MRI of the head/brain was performed without and with the administration of intravenous contrast. COMPARISON: None. HISTORY: ORDERING SYSTEM PROVIDED HISTORY: Syncope, neck pain TECHNOLOGIST PROVIDED HISTORY: Reason for exam:->Syncope, neck pain Decision Support Exception - unselect if not a suspected or confirmed emergency medical condition->Emergency Medical Condition (MA) Reason for Exam: Migraine, syncope, swollen lymph nodes in neck. Multihance 18ml. FINDINGS: Cervical MRI: BONES/ALIGNMENT: There is straightening of cervical lordosis. The vertebral body heights are maintained. The bone marrow signal appears unremarkable. SPINAL CORD: No abnormal cord signal is seen. SOFT TISSUES: No abnormal enhancement of the cervical spine. No paraspinal mass identified. C2-C3: There is no significant disc protrusion, spinal canal stenosis or neural foraminal narrowing. C3-C4: There is no significant disc protrusion, spinal canal stenosis or neural foraminal narrowing. C4-C5: There is no significant disc protrusion, spinal canal stenosis or neural foraminal narrowing. Subtle disc bulging. C5-C6: Mild loss of disc signal.  Posterior uncovertebral hypertrophy with disc bulging. Mild left neural foraminal narrowing. C6-C7: Grade 1 anterolisthesis with disc bulging and 2 mm central disc protrusion. Otherwise unremarkable C7-T1: There is no significant disc protrusion, spinal canal stenosis or neural foraminal narrowing. MRI brain: There is no acute infarction, intracranial hemorrhage, or intracranial mass lesion. No mass effect, midline shift, or extra-axial collection is noted. The brain parenchyma is  normal. The pituitary gland is normal in appearance. The cerebellar tonsils are in normal position. The ventricles, sulci, and cisterns are within normal size and range. No significant volume loss. .  The intracranial flow voids are preserved. The globes and orbits are within normal limits. The visualized extracranial structures including paranasal sinuses and mastoid air cells are unremarkable. There is no acute infarction, intracranial hemorrhage, or intracranial mass lesion. IMPRESSION: Cervical MRI: Straightening of cervical lordosis At C5-C6, grade 1 anterolisthesis and mild left neural foraminal narrowing At C6-C7, grade 1 anterolisthesis and central disc protrusion Brain MRI: Unremarkable contrast enhanced brain MRI. No acute infarction, intracranial hemorrhage or mass lesion. No abnormal enhancement. The patient was seen and examined on day of discharge and this discharge summary is in conjunction with any daily progress note from day of discharge. Time Spent on discharge is 45 minutes  in the examination, evaluation, counseling and review of medications and discharge plan. Note that more than 30 minutes was spent in preparing discharge papers, discussing discharge with patient, medication review, etc.       Signed:    Anastasia Hassan MD   9/28/2022      Thank you Haydee Apodaca DO for the opportunity to be involved in this patient's care.  If you have any questions or concerns please feel free to contact me at [address] [place] [address]

## 2022-09-28 NOTE — CARE COORDINATION
Patient discharged 9/28/2022 to home  All discharge needs met per case management     Mona Diaz RN, BSN  [phone]

## 2022-09-28 NOTE — PROGRESS NOTES
Gastroenterology Progress Note      Daina Haq is a 29 y.o. female patient. 1. Syncope and collapse    2. Atypical migraine    3. Tachycardia        SUBJECTIVE:  no BMs. No abdominal pain. ROS:  Cardiovascular ROS: no chest pain or dyspnea on exertion  Gastrointestinal ROS: see hpi  Respiratory ROS: no cough, shortness of breath, or wheezing    Physical    VITALS:  /68   Pulse 60   Temp 98.5 °F (36.9 °C) (Oral)   Resp 16   Ht 5' 6\" (1.676 m)   Wt 217 lb 12.8 oz (98.8 kg)   LMP 2022   SpO2 98%   BMI 35.15 kg/m²   TEMPERATURE:  Current - Temp: 98.5 °F (36.9 °C); Max - Temp  Av.7 °F (37.1 °C)  Min: 98.5 °F (36.9 °C)  Max: 98.8 °F (37.1 °C)    NAD  RRR  Lungs normal effort  Abdomen soft, ND, NT, no HSM, Bowel sounds normal  AAOx3    Data    Data Review:    Recent Labs     22  1127 22  0734 22  0623   WBC 5.2 5.6 6.6   HGB 13.0 11.1* 11.3*   HCT 39.5 33.5* 33.9*   MCV 87.6 87.7 87.6    211 198     Recent Labs     22  1127 22  0734 22  0623    139 136   K 3.9 4.3 3.9    109 108   CO2 23 25 21   BUN 14 9 10   CREATININE 0.7 0.7 0.6     Recent Labs     22  1127   AST 19   ALT 17   BILITOT 0.5   ALKPHOS 108     Recent Labs     22  1127   LIPASE 15.0     Recent Labs     22  1127   PROTIME 13.4   INR 1.02     No results for input(s): PTT in the last 72 hours. ASSESSMENT     Diarrhea -patient with change in bowel habits about 2 months ago. Has loose stools several times a day for a week then will have solid bowel movements. No hematochezia. Stool studies ordered here (C.diff, GI bacterial pathogens PCR, EIA for parasites, fecal calprotectin and fecal elastase) but no BM or diarrhea. Syncope    Cervical adenopathy - has been eval by ENT and hematology and planning outpatient follow up. PLAN    -Pt is being d/c home today. Plan EGD and colonoscopy outpatient.      Discussed with  Alfredo Haddad, [address] [place] [address]

## 2022-09-29 ENCOUNTER — TELEPHONE (OUTPATIENT)
Dept: PRIMARY CARE CLINIC | Age: 32
End: 2022-09-29

## 2022-09-29 LAB
EPSTEIN BARR VIRUS NUCLEAR AB IGG: 88.3 U/ML (ref 0–21.9)
EPSTEIN-BARR EARLY ANTIGEN ANTIBODY: <5 U/ML (ref 0–10.9)
EPSTEIN-BARR VCA IGG: 251 U/ML (ref 0–21.9)
EPSTEIN-BARR VCA IGM: <10 U/ML (ref 0–43.9)

## 2022-10-05 ENCOUNTER — TELEPHONE (OUTPATIENT)
Dept: PRIMARY CARE CLINIC | Age: 32
End: 2022-10-05

## 2022-10-05 NOTE — TELEPHONE ENCOUNTER
Pt states she worked a late shift and for got about appointment. No show stands. She also wanted to let you know she will hold off on loly right now.  States she has been following up with ent on this matter

## 2022-10-12 ENCOUNTER — TELEPHONE (OUTPATIENT)
Dept: ENT CLINIC | Age: 32
End: 2022-10-12

## 2022-10-12 NOTE — TELEPHONE ENCOUNTER
Please call patient she was under the impression that she would be having an biopsy , she has had the CT and spoken with you about this . SHE  does not want to come in if the appt is not needed . thank you

## 2022-10-26 PROBLEM — E86.0 DEHYDRATION: Status: RESOLVED | Noted: 2022-09-26 | Resolved: 2022-10-26

## 2023-02-03 LAB — HPV COMMENT: NORMAL

## 2023-02-27 PROBLEM — R51.9 HEADACHE: Status: RESOLVED | Noted: 2022-09-26 | Resolved: 2023-02-27

## 2023-02-27 PROBLEM — R55 SYNCOPE AND COLLAPSE: Status: RESOLVED | Noted: 2022-09-26 | Resolved: 2023-02-27

## 2023-02-27 PROBLEM — R32 URINE INCONTINENCE: Status: RESOLVED | Noted: 2022-09-26 | Resolved: 2023-02-27

## 2023-02-27 PROBLEM — R19.7 DIARRHEA: Status: RESOLVED | Noted: 2022-09-26 | Resolved: 2023-02-27

## 2023-02-27 NOTE — PATIENT INSTRUCTIONS
Patient Education        Anxiety Disorder: Care Instructions  Your Care Instructions     Anxiety is a normal reaction to stress. Difficult situations can cause you to have symptoms such as sweaty palms and a nervous feeling. In an anxiety disorder, the symptoms are far more severe. Constant worry, muscle tension, trouble sleeping, nausea and diarrhea, and other symptoms can make normal daily activities difficult or impossible. These symptoms may occur for no reason, and they can affect your work, school, or social life. Medicines, counseling, and self-care can all help. Follow-up care is a key part of your treatment and safety. Be sure to make and go to all appointments, and call your doctor if you are having problems. It's also a good idea to know your test results and keep a list of the medicines you take. How can you care for yourself at home? Take medicines exactly as directed. Call your doctor if you think you are having a problem with your medicine. Go to your counseling sessions and follow-up appointments. Recognize and accept your anxiety. Then, when you are in a situation that makes you anxious, say to yourself, \"This is not an emergency. I feel uncomfortable, but I am not in danger. I can keep going even if I feel anxious. \"  Be kind to your body:  Relieve tension with exercise or a massage. Get enough rest.  Avoid alcohol, caffeine, nicotine, and illegal drugs. They can increase your anxiety level and cause sleep problems. Learn and do relaxation techniques. See below for more about these techniques. Engage your mind. Get out and do something you enjoy. Go to a funny movie, or take a walk or hike. Plan your day. Having too much or too little to do can make you anxious. Keep a record of your symptoms. Discuss your fears with a good friend or family member, or join a support group for people with similar problems. Talking to others sometimes relieves stress.   Get involved in social groups, or volunteer to help others. Being alone sometimes makes things seem worse than they are. Get at least 30 minutes of exercise on most days of the week to relieve stress. Walking is a good choice. You also may want to do other activities, such as running, swimming, cycling, or playing tennis or team sports. Relaxation techniques  Do relaxation exercises 10 to 20 minutes a day. You can play soothing, relaxing music while you do them, if you wish. Tell others in your house that you are going to do your relaxation exercises. Ask them not to disturb you. Find a comfortable place, away from all distractions and noise. Lie down on your back, or sit with your back straight. Focus on your breathing. Make it slow and steady. Breathe in through your nose. Breathe out through either your nose or mouth. Breathe deeply, filling up the area between your navel and your rib cage. Breathe so that your belly goes up and down. Do not hold your breath. Breathe like this for 5 to 10 minutes. Notice the feeling of calmness throughout your whole body. As you continue to breathe slowly and deeply, relax by doing the following for another 5 to 10 minutes:  Tighten and relax each muscle group in your body. You can begin at your toes and work your way up to your head. Imagine your muscle groups relaxing and becoming heavy. Empty your mind of all thoughts. Let yourself relax more and more deeply. Become aware of the state of calmness that surrounds you. When your relaxation time is over, you can bring yourself back to alertness by moving your fingers and toes and then your hands and feet and then stretching and moving your entire body. Sometimes people fall asleep during relaxation, but they usually wake up shortly afterward. Always give yourself time to return to full alertness before you drive a car or do anything that might cause an accident if you are not fully alert. Never play a relaxation tape while you drive a car.   When should you call for help? Call 911 anytime you think you may need emergency care. For example, call if:    You feel you cannot stop from hurting yourself or someone else. Keep the numbers for these national suicide hotlines: 0-154-727-TALK (2-540.901.5476) and 2-426-LQNEEDK (6-865.737.3824). If you or someone you know talks about suicide or feeling hopeless, get help right away. Watch closely for changes in your health, and be sure to contact your doctor if:    You have anxiety or fear that affects your life. You have symptoms of anxiety that are new or different from those you had before. Where can you learn more? Go to https://[employer]. org and sign in to your [employer] account. Enter P754 in the [employer] box to learn more about \"Anxiety Disorder: Care Instructions. \"     If you do not have an account, please click on the \"Sign Up Now\" link. Current as of: September 23, 2020               Content Version: 12.9  © 3249-3123 Healthwise, Incorporated. Care instructions adapted under license by [facility] ([facility]). If you have questions about a medical condition or this instruction, always ask your healthcare professional. [address] any warranty or liability for your use of this information.

## 2023-02-27 NOTE — PROGRESS NOTES
2023     Marino Clarke (:  1990) is a 29 y.o. female, here for evaluation of the following medical concerns:    HPI  Anxiety  Patient is here for evaluation of anxiety. She has the following anxiety symptoms: Anxiety associated with flying. Symptoms include panic, serious fear of negative outcome, need to escape without the ability to do so.has a flight upcoming to [facility] [[email or web address] Review of Systems   Constitutional:  Negative for activity change, fatigue and unexpected weight change. Gastrointestinal:  Negative for nausea and vomiting. Endocrine: Negative for cold intolerance and heat intolerance. Skin:  Negative for rash. Neurological:  Negative for dizziness, seizures and light-headedness. Psychiatric/Behavioral:  Negative for agitation, decreased concentration, dysphoric mood, self-injury, sleep disturbance and suicidal ideas. The patient is nervous/anxious. Prior to Visit Medications    Medication Sig Taking? Authorizing Provider   ALPAJZolam Bronson Prieto) 0.5 MG tablet Take 1 tablet by mouth nightly as needed for Anxiety (fear of flying) for up to 30 days. Max Daily Amount: 0.5 mg Yes Nilson Anger, DO   Norelgestromin-Eth Estradiol Balbir Perez TD) Place onto the skin Yes Historical Provider, MD        Social History     Tobacco Use    Smoking status: Never    Smokeless tobacco: Never   Substance Use Topics    Alcohol use: Never        Vitals:    23 0955   BP: 128/77   Pulse: 82   Temp: 97.1 °F (36.2 °C)   SpO2: 99%   Weight: 213 lb (96.6 kg)   Height: 5' 6\" (1.676 m)     Estimated body mass index is 34.38 kg/m² as calculated from the following:    Height as of this encounter: 5' 6\" (1.676 m). Weight as of this encounter: 213 lb (96.6 kg). Physical Exam  Vitals reviewed. Constitutional:       Appearance: Normal appearance. She is normal weight. HENT:      Head: Normocephalic and atraumatic.       Nose: Nose normal.      Mouth/Throat:      Mouth: Mucous membranes are moist. Pharynx: Oropharynx is clear. Eyes:      Extraocular Movements: Extraocular movements intact. Conjunctiva/sclera: Conjunctivae normal.   Cardiovascular:      Rate and Rhythm: Normal rate and regular rhythm. Pulses: Normal pulses. Heart sounds: Normal heart sounds. Pulmonary:      Effort: Pulmonary effort is normal.      Breath sounds: Normal breath sounds. Musculoskeletal:         General: Normal range of motion. Cervical back: Normal range of motion and neck supple. Skin:     General: Skin is warm and dry. Capillary Refill: Capillary refill takes less than 2 seconds. Neurological:      Mental Status: She is alert. Mental status is at baseline. Psychiatric:         Mood and Affect: Mood normal.         Behavior: Behavior normal.         Thought Content: Thought content normal.         Judgment: Judgment normal.       ASSESSMENT/PLAN:  1. Anxiety with flying: Okay with providing a few tablets of as needed Xanax for fear of flying. Emphasized the importance of not using with alcohol. Understands that we will not be a refill on this medication.  - ALPRAZolam (XANAX) 0.5 MG tablet; Take 1 tablet by mouth nightly as needed for Anxiety (fear of flying) for up to 30 days. Max Daily Amount: 0.5 mg  Dispense: 12 tablet; Refill: 0    Return if symptoms worsen or fail to improve. An electronic signature was used to authenticate this note.     --Scout Lamb DO on 2/28/2023 at 10:35 AM

## 2023-02-28 ENCOUNTER — TELEPHONE (OUTPATIENT)
Dept: PRIMARY CARE CLINIC | Age: 33
End: 2023-02-28

## 2023-02-28 ENCOUNTER — OFFICE VISIT (OUTPATIENT)
Dept: PRIMARY CARE CLINIC | Age: 33
End: 2023-02-28
Payer: COMMERCIAL

## 2023-02-28 VITALS
OXYGEN SATURATION: 99 % | WEIGHT: 213 LBS | HEART RATE: 82 BPM | DIASTOLIC BLOOD PRESSURE: 77 MMHG | HEIGHT: 66 IN | SYSTOLIC BLOOD PRESSURE: 128 MMHG | BODY MASS INDEX: 34.23 KG/M2 | TEMPERATURE: 97.1 F

## 2023-02-28 DIAGNOSIS — F40.243 ANXIETY WITH FLYING: Primary | ICD-10-CM

## 2023-02-28 DIAGNOSIS — Z23 NEED FOR MENINGITIS VACCINATION: ICD-10-CM

## 2023-02-28 DIAGNOSIS — F40.243 ANXIETY WITH FLYING: ICD-10-CM

## 2023-02-28 DIAGNOSIS — Z23 POLIO VACCINE NEEDED: ICD-10-CM

## 2023-02-28 PROCEDURE — 90471 IMMUNIZATION ADMIN: CPT | Performed by: STUDENT IN AN ORGANIZED HEALTH CARE EDUCATION/TRAINING PROGRAM

## 2023-02-28 PROCEDURE — 90472 IMMUNIZATION ADMIN EACH ADD: CPT | Performed by: STUDENT IN AN ORGANIZED HEALTH CARE EDUCATION/TRAINING PROGRAM

## 2023-02-28 PROCEDURE — 90734 MENACWYD/MENACWYCRM VACC IM: CPT | Performed by: STUDENT IN AN ORGANIZED HEALTH CARE EDUCATION/TRAINING PROGRAM

## 2023-02-28 PROCEDURE — 99213 OFFICE O/P EST LOW 20 MIN: CPT | Performed by: STUDENT IN AN ORGANIZED HEALTH CARE EDUCATION/TRAINING PROGRAM

## 2023-02-28 PROCEDURE — 90713 POLIOVIRUS IPV SC/IM: CPT | Performed by: STUDENT IN AN ORGANIZED HEALTH CARE EDUCATION/TRAINING PROGRAM

## 2023-02-28 RX ORDER — ALPRAZOLAM 0.5 MG/1
0.5 TABLET ORAL NIGHTLY PRN
Qty: 12 TABLET | Refills: 0 | Status: SHIPPED | OUTPATIENT
Start: 2023-02-28 | End: 2023-03-01 | Stop reason: SDUPTHER

## 2023-02-28 RX ORDER — ALPRAZOLAM 0.5 MG/1
0.5 TABLET ORAL 2 TIMES DAILY PRN
Qty: 60 TABLET | Refills: 0 | Status: SHIPPED | OUTPATIENT
Start: 2023-02-28 | End: 2023-02-28 | Stop reason: SDUPTHER

## 2023-02-28 ASSESSMENT — PATIENT HEALTH QUESTIONNAIRE - PHQ9
2. FEELING DOWN, DEPRESSED OR HOPELESS: 0
SUM OF ALL RESPONSES TO PHQ QUESTIONS 1-9: 0
SUM OF ALL RESPONSES TO PHQ QUESTIONS 1-9: 0
1. LITTLE INTEREST OR PLEASURE IN DOING THINGS: 0
SUM OF ALL RESPONSES TO PHQ9 QUESTIONS 1 & 2: 0
SUM OF ALL RESPONSES TO PHQ QUESTIONS 1-9: 0
SUM OF ALL RESPONSES TO PHQ QUESTIONS 1-9: 0

## 2023-02-28 ASSESSMENT — ENCOUNTER SYMPTOMS
VOMITING: 0
NAUSEA: 0

## 2023-02-28 NOTE — TELEPHONE ENCOUNTER
Pt said she found out her medication ALPRAZolam (XANAX) 0.5 MG tablet    needs sent to .    NOTE CHANGE  IN [facility] [id number] - [place], [address] [place] [address] [phone]

## 2023-03-01 RX ORDER — ALPRAZOLAM 0.5 MG/1
0.5 TABLET ORAL NIGHTLY PRN
Qty: 12 TABLET | Refills: 0 | Status: SHIPPED | OUTPATIENT
Start: 2023-03-01 | End: 2023-03-31

## 2023-04-18 ENCOUNTER — TELEPHONE (OUTPATIENT)
Dept: PRIMARY CARE CLINIC | Age: 33
End: 2023-04-18

## 2023-04-18 ENCOUNTER — TELEMEDICINE (OUTPATIENT)
Dept: PRIMARY CARE CLINIC | Age: 33
End: 2023-04-18
Payer: COMMERCIAL

## 2023-04-18 DIAGNOSIS — R50.9 FEVER, UNSPECIFIED FEVER CAUSE: Primary | ICD-10-CM

## 2023-04-18 PROCEDURE — 99214 OFFICE O/P EST MOD 30 MIN: CPT | Performed by: STUDENT IN AN ORGANIZED HEALTH CARE EDUCATION/TRAINING PROGRAM

## 2023-04-18 RX ORDER — METHYLPREDNISOLONE 4 MG/1
TABLET ORAL
Qty: 1 KIT | Refills: 0 | Status: SHIPPED | OUTPATIENT
Start: 2023-04-18 | End: 2023-04-24

## 2023-04-18 RX ORDER — AMOXICILLIN AND CLAVULANATE POTASSIUM 875; 125 MG/1; MG/1
1 TABLET, FILM COATED ORAL 2 TIMES DAILY
Qty: 20 TABLET | Refills: 0 | Status: SHIPPED | OUTPATIENT
Start: 2023-04-18 | End: 2023-04-28

## 2023-04-18 SDOH — ECONOMIC STABILITY: FOOD INSECURITY: WITHIN THE PAST 12 MONTHS, THE FOOD YOU BOUGHT JUST DIDN'T LAST AND YOU DIDN'T HAVE MONEY TO GET MORE.: NEVER TRUE

## 2023-04-18 SDOH — ECONOMIC STABILITY: HOUSING INSECURITY
IN THE LAST 12 MONTHS, WAS THERE A TIME WHEN YOU DID NOT HAVE A STEADY PLACE TO SLEEP OR SLEPT IN A SHELTER (INCLUDING NOW)?: NO

## 2023-04-18 SDOH — ECONOMIC STABILITY: INCOME INSECURITY: HOW HARD IS IT FOR YOU TO PAY FOR THE VERY BASICS LIKE FOOD, HOUSING, MEDICAL CARE, AND HEATING?: NOT HARD AT ALL

## 2023-04-18 SDOH — ECONOMIC STABILITY: TRANSPORTATION INSECURITY
IN THE PAST 12 MONTHS, HAS LACK OF TRANSPORTATION KEPT YOU FROM MEETINGS, WORK, OR FROM GETTING THINGS NEEDED FOR DAILY LIVING?: NO

## 2023-04-18 SDOH — ECONOMIC STABILITY: FOOD INSECURITY: WITHIN THE PAST 12 MONTHS, YOU WORRIED THAT YOUR FOOD WOULD RUN OUT BEFORE YOU GOT MONEY TO BUY MORE.: NEVER TRUE

## 2023-04-18 ASSESSMENT — ENCOUNTER SYMPTOMS
WHEEZING: 0
SINUS PRESSURE: 0
SHORTNESS OF BREATH: 0
RHINORRHEA: 0
CHEST TIGHTNESS: 0
COUGH: 1
SINUS PAIN: 0
SORE THROAT: 0

## 2023-04-18 NOTE — PROGRESS NOTES
2023       TELEHEALTH EVALUATION -- Audio/Visual (During RVHVY-62 public health emergency)    HPI:    Urszula Martin (:  1990) has requested an audio/video evaluation for the following concern(s):    Fever  Jeanice Osler presents today for evaluation of fever and generalized malaise. Patient states that she traveled to [facility] for a few weeks back in late February early March. While she was there she was feeling fine. She was also taking antimalarial antibiotics, doxycycline. Towards the end of the trip she started to feel a little bit rundown. A few days after she got home she started having worsening malaise and fevers. She also began noting some facial congestion, but denies headache, sore throat, myalgias, nausea, vomiting, diarrhea or facial pain. She went on the trip with her significant other, who denies similar symptoms. She does not think the symptoms have gotten worse, but does not believe they have improved either. Review of Systems   Constitutional:  Negative for activity change, chills and fever. HENT:  Positive for congestion. Negative for ear pain, rhinorrhea, sinus pressure, sinus pain and sore throat. Respiratory:  Positive for cough. Negative for chest tightness, shortness of breath and wheezing. Cardiovascular:  Negative for chest pain. Musculoskeletal:  Negative for myalgias. Neurological:  Negative for dizziness and headaches. Prior to Visit Medications    Medication Sig Taking? Authorizing Provider   amoxicillin-clavulanate (AUGMENTIN) 875-125 MG per tablet Take 1 tablet by mouth 2 times daily for 10 days Yes Bharathi Adler DO   methylPREDNISolone (MEDROL DOSEPACK) 4 MG tablet Take by mouth.  Yes Bharathi Adler DO   Norelgestromin-Eth Estradiol Mary Knock TD) Place onto the skin Yes Historical Provider, MD       Social History     Tobacco Use    Smoking status: Never    Smokeless tobacco: Never   Vaping Use    Vaping Use: Never used   Substance Use Topics    Alcohol use:

## 2023-04-18 NOTE — TELEPHONE ENCOUNTER
----- Message from Jennie Richter, [address] sent at 4/17/2023  4:30 PM EDT -----  Subject: Referral Request    Reason for referral request? Patient would like a CBC. She would like to   test for immune disorders due to being sick for a past couple months. Patient seems to have a difficult time fighting off infection. Provider patient wants to be referred to(if known):     Provider Phone Number(if known):     Additional Information for Provider?   ---------------------------------------------------------------------------  --------------  [address] [employer]    [phone]; OK to leave message on voicemail  ---------------------------------------------------------------------------  --------------

## 2023-06-05 DIAGNOSIS — F40.243 ANXIETY WITH FLYING: Primary | ICD-10-CM

## 2023-06-05 RX ORDER — ALPRAZOLAM 1 MG/1
1 TABLET ORAL NIGHTLY PRN
Qty: 5 TABLET | Refills: 0 | Status: SHIPPED | OUTPATIENT
Start: 2023-06-05 | End: 2023-07-05

## 2023-06-05 NOTE — TELEPHONE ENCOUNTER
ALPRAZolam (XANAX) 0.5 MG tablet   Needs 1 mg for flying leaving today would like today if possible   [facility] #[id number]- [place], [address] [place] [address].  Gretta Bores [phone] - F [phone]

## 2023-08-15 ENCOUNTER — TELEPHONE (OUTPATIENT)
Dept: PRIMARY CARE CLINIC | Age: 33
End: 2023-08-15

## 2023-08-15 DIAGNOSIS — F40.243 ANXIETY WITH FLYING: ICD-10-CM

## 2023-08-15 RX ORDER — ALPRAZOLAM 1 MG/1
1 TABLET ORAL NIGHTLY PRN
Qty: 5 TABLET | Refills: 0 | Status: SHIPPED | OUTPATIENT
Start: 2023-08-15 | End: 2023-09-14

## 2023-08-15 NOTE — TELEPHONE ENCOUNTER
Medication:   Requested Prescriptions     Pending Prescriptions Disp Refills    ALPRAZolam (XANAX) 1 MG tablet 5 tablet 0     Sig: Take 1 tablet by mouth nightly as needed for Sleep for up to 30 days. Max Daily Amount: 1 mg        Last Filled:  06/05/23, Patient taking multiple flights for honeymoon and would like a few extra please    Patient Phone Number: [phone] (home)     Last appt: 4/18/2023   Next appt: Visit date not found    Last OARRS: No flowsheet data found.

## 2023-08-15 NOTE — TELEPHONE ENCOUNTER
ALPRAZolam (XANAX) 1 MG tablet   WOULD LIKE REFILL OF MEDICATION FOR WHEN SHE FLIES. SHE IS TAKING MULTIPLE FLIGHTS FOR [place] WOULD LIKE A FEW EXTRAS.      [facility] #[id number]- [facility] [address]  Omari Floyd [phone] - F [phone]